# Patient Record
Sex: FEMALE | Race: BLACK OR AFRICAN AMERICAN | NOT HISPANIC OR LATINO
[De-identification: names, ages, dates, MRNs, and addresses within clinical notes are randomized per-mention and may not be internally consistent; named-entity substitution may affect disease eponyms.]

---

## 2020-07-10 ENCOUNTER — RESULT REVIEW (OUTPATIENT)
Age: 60
End: 2020-07-10

## 2022-03-15 DIAGNOSIS — Z00.00 ENCOUNTER FOR GENERAL ADULT MEDICAL EXAMINATION W/OUT ABNORMAL FINDINGS: ICD-10-CM

## 2022-03-16 ENCOUNTER — APPOINTMENT (OUTPATIENT)
Dept: ORTHOPEDIC SURGERY | Facility: CLINIC | Age: 62
End: 2022-03-16
Payer: COMMERCIAL

## 2022-03-16 VITALS
HEIGHT: 59 IN | WEIGHT: 203 LBS | HEART RATE: 80 BPM | BODY MASS INDEX: 40.92 KG/M2 | SYSTOLIC BLOOD PRESSURE: 145 MMHG | DIASTOLIC BLOOD PRESSURE: 86 MMHG

## 2022-03-16 PROCEDURE — 99204 OFFICE O/P NEW MOD 45 MIN: CPT | Mod: 25

## 2022-03-16 PROCEDURE — 20610 DRAIN/INJ JOINT/BURSA W/O US: CPT | Mod: 50

## 2022-03-16 NOTE — DISCUSSION/SUMMARY
[de-identified] : The patient has severe right knee and moderate left knee osteoarthritis with severe stiffness. They are not an appropriate candidate for surgical intervention at this time as she needs to . An extensive discussion was conducted on the natural history of the disease and the variety of surgical and non-surgical options available to the patient including, but not limited to non-steroidal anti-inflammatory medications, steroid injections, physical therapy, maintenance of ideal body weight, and reduction of activity. I recommended a prescription of Mobic but the patient would prefer to use OTC NSAIDs at this time. The patient will schedule an appointment as needed. \par \par Informed consent for bilat knee injections was obtained. All questions were answered. A time out was performed. The  knees were prepped and draped in sterile fashion. Using sterile technique, 40mg of Kenalog, 4cc of 1% lidocaine, 4cc of 0.25% marcaine using a 21-gauge needle. A sterile dressing was applied. Post injection instructions were reviewed. The patient tolerated the procedure well. \par \par We gave her a Rx for PT. She understands the importance of getting her ROM back in the right knee.  I did explain to the patient that their range of motion may not significantly improve after the surgery given the preoperative contracture. The patient expressed understanding of this and has elected to undergo total knee arthroplasty.

## 2022-03-16 NOTE — HISTORY OF PRESENT ILLNESS
[de-identified] : This is a very nice  61 year old  female experiencing worsening pain in both knees R>L. Her ROM is limited chronically. Her right knee pain is severe in intensity and has been going on for at least 3 months now. The pain substantially limits activities of daily living. Walking tolerance is reduced. Medication and activity modification have been minimally effective for a period lasting greater than three months in duration. Assistive devices and external support were not deemed by the patient to be helpful in improving their function.. The patient denies any radiation of the pain to the feet and it is not associated with numbness, tingling, or weakness.

## 2022-03-16 NOTE — PHYSICAL EXAM
[de-identified] : Well developed, well nourished in no apparent distress, awake, alert and orientated to person, place and time. with appropriate mood and affect. \par Respirations are even and unlabored. Gait evaluation does reveal a limp. There is no inguinal adenopathy. \par The legs are well-perfused, without skin lesions, shows a grossly normal motor and sensory examination. \par Knee motion does cause significant pain. ROM of both knees are right 5-60;  left 0-110 degrees.  5 degrees varus.\par The knees are stable within that range-of-motion to AP and ML stress. \par Muscle strength is normal. Pedal pulses are palpable.  [de-identified] : Long standing  knee, AP knee, lateral knee, and patellar views of the bilat knees brought in by patient demonstrate severe right and moderate left knee degenerative joint disease of the knee with joint space narrowing, osteophyte formation, and subchondral sclerosis.

## 2022-05-18 ENCOUNTER — APPOINTMENT (OUTPATIENT)
Dept: ORTHOPEDIC SURGERY | Facility: CLINIC | Age: 62
End: 2022-05-18
Payer: COMMERCIAL

## 2022-05-18 PROCEDURE — 99214 OFFICE O/P EST MOD 30 MIN: CPT

## 2022-05-18 RX ORDER — HYALURONATE SODIUM 20 MG/2 ML
20 SYRINGE (ML) INTRAARTICULAR
Qty: 2 | Refills: 0 | Status: ACTIVE | COMMUNITY
Start: 2022-05-18

## 2022-05-18 RX ORDER — MELOXICAM 15 MG/1
15 TABLET ORAL
Qty: 30 | Refills: 2 | Status: ACTIVE | COMMUNITY
Start: 2022-05-18 | End: 1900-01-01

## 2022-05-18 NOTE — PHYSICAL EXAM
[de-identified] : Well developed, well nourished in no apparent distress, awake, alert and orientated to person, place and time. with appropriate mood and affect. \par Respirations are even and unlabored. Gait evaluation does reveal a limp. There is no inguinal adenopathy. \par The legs are well-perfused, without skin lesions, shows a grossly normal motor and sensory examination. \par Knee motion does cause significant pain. ROM of both knees are right 5-700;  left 0-110 degrees.  5 degrees varus.\par The knees are stable within that range-of-motion to AP and ML stress. \par Muscle strength is normal. Pedal pulses are palpable.

## 2022-05-18 NOTE — DISCUSSION/SUMMARY
[de-identified] : The patient has severe right knee and moderate left knee osteoarthritis with severe stiffness. They are not an appropriate candidate for surgical intervention at this time as she needs to . An extensive discussion was conducted on the natural history of the disease and the variety of surgical and non-surgical options available to the patient including, but not limited to non-steroidal anti-inflammatory medications, steroid injections, physical therapy, maintenance of ideal body weight, and reduction of activity. Mobic rxd. Continue PT. I will apply to her insurance for authorization for euflexxa injection series. Follow up when euflexxa is authorized.

## 2022-05-18 NOTE — HISTORY OF PRESENT ILLNESS
[de-identified] : This is a very nice 61 year old  female experiencing worsening pain in both knees R>L. Her ROM is limited chronically. Her right knee pain is severe in intensity and has been going on for at least 3 months now. The pain substantially limits activities of daily living. Walking tolerance is reduced. Assistive devices and external support were not deemed by the patient to be helpful in improving their function.. The patient denies any radiation of the pain to the feet and it is not associated with numbness, tingling, or weakness.  Cortisone helped x 2 months. Working with PT.

## 2022-08-12 ENCOUNTER — APPOINTMENT (OUTPATIENT)
Dept: ORTHOPEDIC SURGERY | Facility: CLINIC | Age: 62
End: 2022-08-12

## 2022-08-12 VITALS — WEIGHT: 194 LBS | HEIGHT: 59 IN | BODY MASS INDEX: 39.11 KG/M2

## 2022-08-12 PROCEDURE — 99214 OFFICE O/P EST MOD 30 MIN: CPT | Mod: 25

## 2022-08-12 PROCEDURE — 20610 DRAIN/INJ JOINT/BURSA W/O US: CPT | Mod: RT

## 2022-08-13 NOTE — PHYSICAL EXAM
[de-identified] : Well developed, well nourished in no apparent distress, awake, alert and orientated to person, place and time. with appropriate mood and affect. \par Respirations are even and unlabored. Gait evaluation does reveal a limp. There is no inguinal adenopathy. \par The legs are well-perfused, without skin lesions, shows a grossly normal motor and sensory examination. \par Knee motion does cause significant pain. ROM of both knees are right 10-70;  left 0-110 degrees.  5 degrees varus.\par The knees are stable within that range-of-motion to AP and ML stress. \par Muscle strength is normal. Pedal pulses are palpable.

## 2022-08-13 NOTE — DISCUSSION/SUMMARY
[de-identified] : The patient has severe right knee and moderate left knee osteoarthritis with severe stiffness. They are not an appropriate candidate for surgical intervention at this time as she needs to . An extensive discussion was conducted on the natural history of the disease and the variety of surgical and non-surgical options available to the patient including, but not limited to non-steroidal anti-inflammatory medications, steroid injections, physical therapy, maintenance of ideal body weight, and reduction of activity. Continue PT. today we performed a right knee intra-articular cortisone injection.\par \par Informed consent for the right knee injection was obtained. All questions were answered. A time out was performed. The right knee was prepped and draped in sterile fashion. Using sterile technique, the right knee was injected with 80mg of Kenalog, 4cc of 1% lidocaine, 4cc of 0.25% marcaine using a 21-gauge needle. A sterile dressing was applied. Post injection instructions were reviewed. The patient tolerated the procedure well.\par

## 2022-08-13 NOTE — HISTORY OF PRESENT ILLNESS
[de-identified] : This is a very nice 61 year old female experiencing worsening pain in both knees R>L.  She has right knee osteoarthritis.  Her ROM is limited chronically. Her right knee pain is severe in intensity and has been going on for at least 3 months now. The pain substantially limits activities of daily living. Walking tolerance is reduced. Assistive devices and external support were not deemed by the patient to be helpful in improving their function.. The patient denies any radiation of the pain to the feet and it is not associated with numbness, tingling, or weakness.  Cortisone helped x 2 months. Working with PT. working on weight loss.  Euflexxa was previously denied.  Previously took Mobic but this was discontinued by her primary care physician due to increasing creatinine.  Physical therapy has helped.

## 2023-01-26 ENCOUNTER — APPOINTMENT (OUTPATIENT)
Dept: SURGERY | Facility: CLINIC | Age: 63
End: 2023-01-26
Payer: COMMERCIAL

## 2023-01-26 VITALS
SYSTOLIC BLOOD PRESSURE: 180 MMHG | WEIGHT: 210 LBS | BODY MASS INDEX: 42.33 KG/M2 | HEIGHT: 59 IN | HEART RATE: 84 BPM | TEMPERATURE: 96.9 F | DIASTOLIC BLOOD PRESSURE: 101 MMHG

## 2023-01-26 DIAGNOSIS — Z82.49 FAMILY HISTORY OF ISCHEMIC HEART DISEASE AND OTHER DISEASES OF THE CIRCULATORY SYSTEM: ICD-10-CM

## 2023-01-26 DIAGNOSIS — Z78.9 OTHER SPECIFIED HEALTH STATUS: ICD-10-CM

## 2023-01-26 DIAGNOSIS — Z86.39 PERSONAL HISTORY OF OTHER ENDOCRINE, NUTRITIONAL AND METABOLIC DISEASE: ICD-10-CM

## 2023-01-26 DIAGNOSIS — Z83.3 FAMILY HISTORY OF DIABETES MELLITUS: ICD-10-CM

## 2023-01-26 DIAGNOSIS — Z63.4 DISAPPEARANCE AND DEATH OF FAMILY MEMBER: ICD-10-CM

## 2023-01-26 DIAGNOSIS — Z87.891 PERSONAL HISTORY OF NICOTINE DEPENDENCE: ICD-10-CM

## 2023-01-26 DIAGNOSIS — Z86.79 PERSONAL HISTORY OF OTHER DISEASES OF THE CIRCULATORY SYSTEM: ICD-10-CM

## 2023-01-26 DIAGNOSIS — Z12.39 ENCOUNTER FOR OTHER SCREENING FOR MALIGNANT NEOPLASM OF BREAST: ICD-10-CM

## 2023-01-26 PROCEDURE — 99243 OFF/OP CNSLTJ NEW/EST LOW 30: CPT

## 2023-01-26 RX ORDER — SIMVASTATIN 20 MG/1
20 TABLET, FILM COATED ORAL
Refills: 0 | Status: ACTIVE | COMMUNITY

## 2023-01-26 RX ORDER — ATENOLOL 100 MG/1
100 TABLET ORAL
Refills: 0 | Status: ACTIVE | COMMUNITY

## 2023-01-26 RX ORDER — NIFEDIPINE 90 MG
90 TABLET, EXTENDED RELEASE ORAL
Refills: 0 | Status: ACTIVE | COMMUNITY

## 2023-01-26 SDOH — SOCIAL STABILITY - SOCIAL INSECURITY: DISSAPEARANCE AND DEATH OF FAMILY MEMBER: Z63.4

## 2023-01-26 NOTE — CONSULT LETTER
[Dear  ___] : Dear  [unfilled], [Consult Letter:] : I had the pleasure of evaluating your patient, [unfilled]. [Please see my note below.] : Please see my note below. [Consult Closing:] : Thank you very much for allowing me to participate in the care of this patient.  If you have any questions, please do not hesitate to contact me. [Sincerely,] : Sincerely, [FreeTextEntry3] : Murray Reis MD, FACS

## 2023-01-26 NOTE — DATA REVIEWED
[FreeTextEntry1] : 			\par Exam requested by:\par TRISTIAN ESCOBAR MD\par [address]par [facility] [id number]\par SITE PERFORMED: [facility]\par SITE PHONE: [phone]\par Patient: HERBER MATTHEWS\par YOB: 1960\par Phone: [phone]\par MRN: [id number] Acc: [id number]\par Date of Exam: 2022\par  \par EXAM: ULTRASOUND-GUIDED CORE BIOPSY 1 SITE\par \par HISTORY: The patient is 61 years old and is referred for an ultrasound-guided needle biopsy of a left 9:00 position mass.\par \par COMPARISON: Prior breast imaging studies dated 8/3/2021 \par \par PROCEDURE: Targeted ultrasound performed prior to the procedure reconfirms the suspicious findin.4 cm mass at the left 9 o'clock location, 8 cm from the nipple. \par \par The risks and benefits of the procedure were conveyed to the patient, and the patient consented to the procedure. Universal timeout was performed.\par \par Using sterile technique, 5 mL of 1% lidocaine was administered. A skin incision was made prior to insertion of the biopsy needle. Under sonographic visualization, a 14-gauge spring-loaded core biopsy device was used to sample the target. 3 samples were obtained. An S shaped biopsy clip was deployed at the biopsy site. A sonographically visualized residual lesion was present post core biopsy. \par \par A postprocedure mammogram demonstrates appropriate placement of the clip. \par \par The patient tolerated the procedure well without complications. The patient was given postbiopsy care instructions. The specimen was subsequently sent to the pathology lab. \par \par IMPRESSION: 1 site ultrasound-guided core biopsy was performed. 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 \par \par Thank you for the opportunity to participate in the care of this patient.  \par  \par Sirisha Horan MD  - Electronically Signed: 2022 11:04 AM \par Physician to Physician Direct Line is: [phone]

## 2023-01-26 NOTE — PLAN
[FreeTextEntry1] : Ms. MATTHEWS  is presenting  today for an evaluation .  she is doing well and offers no complaints.  Results of  her recent  imaging, breast biopsy  and physical examination findings were discussed in details.   She  was advised to have BL     breast US and Mammogram  and return after the tests. Importance of monthly self-breast examination was reinforced.  Patient's questions and concerns addressed to patient's satisfaction.\par

## 2023-01-26 NOTE — HISTORY OF PRESENT ILLNESS
[de-identified] :  Patient is a 62 year -old female  who was referred by Dr. TRISTIAN ESCOBAR with the chief complaint of having a Left  breast mass. She  denies any trauma and She has no nipple discharge. She  denies any fever, night sweats or loss of appetite.    There is no family history of breast carcinoma.   Menarche at age 9 -3 para-3 and her last menstrual period was in her late 40s. Patient is on no hormonal replacement therapy.    Patient  had a Left breast US  on 2022 that was deemed   BIRADS  4. Ms. MATTHEWS  is s/p US guided left breast biopsy on 2022 . Patient's pathology results were  consistent with fibroadenoma. Concordant. patient had her last Mammogram in 2021

## 2023-01-26 NOTE — PHYSICAL EXAM
[Alert] : alert [Oriented to Person] : oriented to person [Oriented to Place] : oriented to place [Oriented to Time] : oriented to time [Calm] : calm [de-identified] : She  is alert, well-groomed, and in NAD\par   [de-identified] : anicteric.  Nasal mucosa pink, septum midline. Oral mucosa pink.  Tongue midline, Pharynx without exudates.\par   [de-identified] : Neck supple. Trachea midline. Thyroid isthmus barely palpable, lobes not felt.\par   [de-identified] : No chest deformity. Breast are symmetric, Normal contours. No nodules, masses, tenderness, or axillary or supraclavicular  adenopathy. No nipple discharge. no skin retraction \par

## 2023-04-04 ENCOUNTER — APPOINTMENT (OUTPATIENT)
Dept: ORTHOPEDIC SURGERY | Facility: CLINIC | Age: 63
End: 2023-04-04
Payer: COMMERCIAL

## 2023-04-04 VITALS — WEIGHT: 204 LBS | BODY MASS INDEX: 41.12 KG/M2 | HEIGHT: 59 IN

## 2023-04-04 PROCEDURE — 99214 OFFICE O/P EST MOD 30 MIN: CPT | Mod: 25

## 2023-04-04 PROCEDURE — 20610 DRAIN/INJ JOINT/BURSA W/O US: CPT | Mod: RT

## 2023-04-04 NOTE — HISTORY OF PRESENT ILLNESS
[de-identified] : This is a very nice 62 year old female experiencing worsening pain in both knees R>L.  She has right knee osteoarthritis.  Her ROM is limited chronically. Her right knee pain is severe in intensity. . The pain substantially limits activities of daily living. Walking tolerance is reduced. Assistive devices and external support were not deemed by the patient to be helpful in improving their function.. The patient denies any radiation of the pain to the feet and it is not associated with numbness, tingling, or weakness.  Cortisone helped x 2 months. Working with PT. working on weight loss.  Euflexxa was previously denied.  Previously took Mobic but this was discontinued by her primary care physician due to increasing creatinine.  Physical therapy has helped.

## 2023-04-04 NOTE — PHYSICAL EXAM
[de-identified] : Well developed, well nourished in no apparent distress, awake, alert and orientated to person, place and time. with appropriate mood and affect. \par Respirations are even and unlabored. Gait evaluation does reveal a limp. There is no inguinal adenopathy. \par The legs are well-perfused, without skin lesions, shows a grossly normal motor and sensory examination. \par Knee motion does cause significant pain. ROM of both knees are right 10-70;  left 0-110 degrees.  5 degrees varus.\par The knees are stable within that range-of-motion to AP and ML stress. \par Muscle strength is normal. Pedal pulses are palpable.

## 2023-04-04 NOTE — DISCUSSION/SUMMARY
[de-identified] : The patient has severe right knee and moderate left knee osteoarthritis with severe stiffness. They are not an appropriate candidate for surgical intervention at this time as she needs to . An extensive discussion was conducted on the natural history of the disease and the variety of surgical and non-surgical options available to the patient including, but not limited to non-steroidal anti-inflammatory medications, steroid injections, physical therapy, maintenance of ideal body weight, and reduction of activity. Continue PT. today we performed a right knee intra-articular cortisone injection.\par \par Informed consent for the right knee injection was obtained. All questions were answered. A time out was performed. The right knee was prepped and draped in sterile fashion. Using sterile technique, the right knee was injected with 80mg of Kenalog, 4cc of 1% lidocaine, 4cc of 0.25% marcaine using a 21-gauge needle. A sterile dressing was applied. Post injection instructions were reviewed. The patient tolerated the procedure well.\par

## 2023-04-14 PROBLEM — R92.8 ABNORMAL ULTRASOUND OF BREAST: Status: ACTIVE | Noted: 2023-04-14

## 2023-04-17 ENCOUNTER — APPOINTMENT (OUTPATIENT)
Dept: SURGERY | Facility: CLINIC | Age: 63
End: 2023-04-17
Payer: COMMERCIAL

## 2023-04-17 DIAGNOSIS — R92.8 OTHER ABNORMAL AND INCONCLUSIVE FINDINGS ON DIAGNOSTIC IMAGING OF BREAST: ICD-10-CM

## 2023-04-24 ENCOUNTER — APPOINTMENT (OUTPATIENT)
Dept: SURGERY | Facility: CLINIC | Age: 63
End: 2023-04-24
Payer: COMMERCIAL

## 2023-04-24 VITALS
HEART RATE: 92 BPM | DIASTOLIC BLOOD PRESSURE: 84 MMHG | SYSTOLIC BLOOD PRESSURE: 164 MMHG | WEIGHT: 204 LBS | BODY MASS INDEX: 41.12 KG/M2 | HEIGHT: 59 IN

## 2023-04-24 DIAGNOSIS — N63.20 UNSPECIFIED LUMP IN THE LEFT BREAST, UNSPECIFIED QUADRANT: ICD-10-CM

## 2023-04-24 PROCEDURE — 99213 OFFICE O/P EST LOW 20 MIN: CPT

## 2023-04-24 NOTE — HISTORY OF PRESENT ILLNESS
[de-identified] : This is  a 62 year   old patient presenting today for a breast exam and to discuss the results of her recent  breast imaging. Patient had a BL breast US and   BL breast Mammogram on 2023. Deemed BIRADS category 4.  Ms. MATTHEWS denies any trauma and she has no nipple discharge. Patient denies any fever, night sweats or loss of appetite.    Patient is on no hormonal replacement therapy. \par \par PERTINENT HISTORY: \par Patient is a 62 year -old female who was referred by Dr. TRISTIAN ESCOBAR with the chief complaint of having a Left breast mass.  There is no family history of breast carcinoma. Menarche at age 9 -3 para-3 and her last menstrual period was in her late 40s.    Patient had a Left breast US on 2022 that was deemed BIRADS 4. Ms. MATTHEWS is s/p US guided left breast biopsy on 2022. Patient's pathology results were consistent with fibroadenoma. Concordant. patient had her last Mammogram in 2021 [de-identified] : Patient reports no interval changes to her overall health status or medical history

## 2023-04-24 NOTE — PHYSICAL EXAM
[Alert] : alert [Oriented to Person] : oriented to person [Oriented to Place] : oriented to place [Oriented to Time] : oriented to time [Calm] : calm [de-identified] : She  is alert, well-groomed, and in NAD\par   [de-identified] : anicteric.  Nasal mucosa pink, septum midline. Oral mucosa pink.  Tongue midline, Pharynx without exudates.\par   [de-identified] : Neck supple. Trachea midline. Thyroid isthmus barely palpable, lobes not felt.\par   [de-identified] : No chest deformity. Breast are symmetric, Normal contours. No nodules, masses, tenderness, or axillary or supraclavicular  adenopathy. No nipple discharge. no skin retraction \par

## 2023-04-24 NOTE — PLAN
[FreeTextEntry1] : Ms. MATTHEWS  is presenting  today for an evaluation .  she is doing well and offers no complaints.  Results of  her recent  imaging and physical examination findings were discussed in details.   She  was advised to have    Left   breast US guided biopsy   and return after the tests. Importance of monthly self-breast examination was reinforced.  Patient's questions and concerns addressed to patient's satisfaction.\par

## 2023-12-19 ENCOUNTER — APPOINTMENT (OUTPATIENT)
Dept: ORTHOPEDIC SURGERY | Facility: CLINIC | Age: 63
End: 2023-12-19
Payer: COMMERCIAL

## 2023-12-19 VITALS — BODY MASS INDEX: 39.11 KG/M2 | HEIGHT: 59 IN | WEIGHT: 194 LBS

## 2023-12-19 PROCEDURE — 73564 X-RAY EXAM KNEE 4 OR MORE: CPT | Mod: 50

## 2023-12-19 PROCEDURE — 99214 OFFICE O/P EST MOD 30 MIN: CPT | Mod: 25

## 2023-12-19 PROCEDURE — 20610 DRAIN/INJ JOINT/BURSA W/O US: CPT | Mod: RT

## 2023-12-19 NOTE — HISTORY OF PRESENT ILLNESS
[de-identified] : This is a very nice 62 year old female experiencing worsening pain in both knees R>L.  She has right knee osteoarthritis.  Her ROM is limited chronically. Her right knee pain is severe in intensity. . The pain substantially limits activities of daily living. Walking tolerance is reduced. Assistive devices and external support were not deemed by the patient to be helpful in improving their function.. The patient denies any radiation of the pain to the feet and it is not associated with numbness, tingling, or weakness.  Cortisone helped x 2 months. Working with PT. working on weight loss.  Euflexxa was previously denied.  Previously took Mobic but this was discontinued by her primary care physician due to increasing creatinine.  Physical therapy has helped.

## 2023-12-19 NOTE — DISCUSSION/SUMMARY
[de-identified] : The patient has severe right knee and moderate left knee osteoarthritis with severe stiffness. They are not an appropriate candidate for surgical intervention at this time as she needs to . An extensive discussion was conducted on the natural history of the disease and the variety of surgical and non-surgical options available to the patient including, but not limited to non-steroidal anti-inflammatory medications, steroid injections, physical therapy, maintenance of ideal body weight, and reduction of activity. Continue PT. today we performed a right knee intra-articular cortisone injection.\par  \par  Informed consent for the right knee injection was obtained. All questions were answered. A time out was performed. The right knee was prepped and draped in sterile fashion. Using sterile technique, the right knee was injected with 80mg of Kenalog, 4cc of 1% lidocaine, 4cc of 0.25% marcaine using a 21-gauge needle. A sterile dressing was applied. Post injection instructions were reviewed. The patient tolerated the procedure well.\par

## 2023-12-19 NOTE — PHYSICAL EXAM
[de-identified] : Well developed, well nourished in no apparent distress, awake, alert and orientated to person, place and time. with appropriate mood and affect. \par  Respirations are even and unlabored. Gait evaluation does reveal a limp. There is no inguinal adenopathy. \par  The legs are well-perfused, without skin lesions, shows a grossly normal motor and sensory examination. \par  Knee motion does cause significant pain. ROM of both knees are right 10-70;  left 0-110 degrees.  5 degrees varus.\par  The knees are stable within that range-of-motion to AP and ML stress. \par  Muscle strength is normal. Pedal pulses are palpable.  [de-identified] : Long standing knee, AP knee, lateral knee, and patellar views of the bilat knees brought in by patient demonstrate severe right and moderate left knee degenerative joint disease of the knee with joint space narrowing, osteophyte formation, and subchondral sclerosis.

## 2024-04-09 ENCOUNTER — APPOINTMENT (OUTPATIENT)
Dept: ORTHOPEDIC SURGERY | Facility: CLINIC | Age: 64
End: 2024-04-09
Payer: COMMERCIAL

## 2024-04-09 VITALS — HEIGHT: 59 IN | WEIGHT: 198 LBS | BODY MASS INDEX: 39.92 KG/M2

## 2024-04-09 PROCEDURE — 99214 OFFICE O/P EST MOD 30 MIN: CPT

## 2024-04-09 NOTE — DISCUSSION/SUMMARY
[de-identified] : The patient has severe right knee and moderate left knee osteoarthritis with severe stiffness. She has failed conservative management including injections, PT, and mobic. An extensive discussion was conducted on the natural history of the disease and the variety of surgical and non-surgical options available to the patient including, but not limited to non-steroidal anti-inflammatory medications, steroid injections, physical therapy, maintenance of ideal body weight, and reduction of activity. Continue PT. Discussed that preoperative limited knee ROM can predict poor postoperative out Referred for evaluation to my partner Dr. Robison.

## 2024-04-09 NOTE — HISTORY OF PRESENT ILLNESS
[de-identified] : This is a very nice 63 year old female experiencing worsening pain in both knees R>L.  She has right knee osteoarthritis.  Her ROM is limited chronically and she stopped PT already. Her right knee pain is severe in intensity. . The pain substantially limits activities of daily living. Walking tolerance is reduced. Assistive devices and external support were not deemed by the patient to be helpful in improving their function.. The patient denies any radiation of the pain to the feet and it is not associated with numbness, tingling, or weakness.  Cortisone helped x 2 months. Working with PT. working on weight loss.  Euflexxa was previously denied.  Previously took Mobic but this was discontinued by her primary care physician due to increasing creatinine.  Physical therapy has helped.

## 2024-04-09 NOTE — PHYSICAL EXAM
[de-identified] : Well developed, well nourished in no apparent distress, awake, alert and orientated to person, place and time. with appropriate mood and affect.  Respirations are even and unlabored. Gait evaluation does reveal a limp. There is no inguinal adenopathy.  The legs are well-perfused, without skin lesions, shows a grossly normal motor and sensory examination.  Knee motion does cause significant pain. ROM of both knees are right 10-70;  left 0-105 degrees.  5 degrees varus. The knees are stable within that range-of-motion to AP and ML stress.  Muscle strength is normal. Pedal pulses are palpable.  [de-identified] : Long standing knee, AP knee, lateral knee, and patellar views of the bilat knees brought in by patient demonstrate severe right and moderate left knee degenerative joint disease of the knee with joint space narrowing, osteophyte formation, and subchondral sclerosis.

## 2024-04-12 ENCOUNTER — APPOINTMENT (OUTPATIENT)
Dept: ORTHOPEDIC SURGERY | Facility: CLINIC | Age: 64
End: 2024-04-12
Payer: COMMERCIAL

## 2024-04-12 VITALS
BODY MASS INDEX: 39.92 KG/M2 | HEIGHT: 59 IN | SYSTOLIC BLOOD PRESSURE: 146 MMHG | WEIGHT: 198 LBS | DIASTOLIC BLOOD PRESSURE: 84 MMHG

## 2024-04-12 PROCEDURE — 72170 X-RAY EXAM OF PELVIS: CPT

## 2024-04-12 PROCEDURE — 73564 X-RAY EXAM KNEE 4 OR MORE: CPT | Mod: 50

## 2024-04-12 PROCEDURE — 99214 OFFICE O/P EST MOD 30 MIN: CPT

## 2024-04-14 NOTE — HISTORY OF PRESENT ILLNESS
[de-identified] : Oliva Kunz is a 63 year old female who presented to the office for evaluation of her bilateral knee pain. Patient has been experiencing bilateral (right greater than left) knee pain for years. Pain is located over the anterior and posterolateral knee. It can radiate to the tibias. Patient can have back pain that radiates down the legs. She has tried anti-inflammatories, but it increased her creatinine. She now takes Tylenol, without significant relief. Patient has tried physical therapy, which initially helped. She has tried corticosteroid injections, which helped for about 4 weeks. He last injection was in December. Viscosupplementation injections were previously denied by insurance. No hip pain.  History: HTN, Renal Cysts

## 2024-04-14 NOTE — PHYSICAL EXAM
[de-identified] : Constitutional:  63 year old female, alert and oriented, cooperative, in no acute distress.  HEENT  NC/AT.  Appearance: symmetric  Chest/Respiratory  Respiratory effort: no intercostal retractions or use of accessory muscles. Nonlabored Breathing  Mental Status:  Judgment, insight: intact Orientation: oriented to time, place, and person  Left Knee  Inspection:     Skin intact, no rashes or lesions     No Effusion     Non-tender to palpation over tibial tubercle, patella, medial and lateral joint line, and pes insertion.  Range of Motion: 	Extension - 0 degrees 	Flexion - 105 degrees 	Alignment - Varus 3 degrees 	Extensor lag: None  Stability:      Demonstrates no Varus or Valgus instability      Negative Anterior or Posterior drawer.      Negative Lachman's  Patella: stable, tracks well.   Right Knee  Inspection:     Skin intact, no rashes or lesions     No Effusion     Non-tender to palpation over tibial tubercle, patella, medial and lateral joint line, and pes insertion.  Range of Motion: 	Extension - 0 degrees 	Flexion - 80 degrees 	Alignment - Varus 3 degrees 	Extensor lag: None  Stability:      Demonstrates no Varus or Valgus instability      Negative Anterior or Posterior drawer.      Negative Lachman's  Patella: stable, tracks well.   Neurologic Exam     Motor intact including 5/5 Extensor Hallucis Longus, 5/5 Flexor Hallucis Longus, 5/5 Tibialis Anterior and 5/5 Gastrocnemius     Sensation Intact to Light Touch including Saphenous, Sural, Superficial Peroneal, Deep Peroneal, Tibial nerve distributions  Vascular Exam     Foot is warm and well perfused with 2+ Dorsalis Pedis Pulse   No pain with range of motion of the bilateral hips. No lumbar paraspinal muscle tenderness.  [de-identified] : XRay: XRays of the Right Knee (4 Views) taken in the office today and reviewed with the patient. XRays demonstrate tricompartmental joint space narrowing, with bone on bone articulations in the medial compartment, with subchondral sclerosis, overlying osteophytes, all consistent with severe osteoarthritis, KL rdGrdrrdarddrderd:rd rd3rd. There is varus alignment. (my personal interpretation)   XRay: XRays of the Left Knee (4 Views) taken in the office today and reviewed with the patient. XRays demonstrate tricompartmental joint space narrowing, with bone on bone articulations in the medial compartment, with subchondral sclerosis, overlying osteophytes, all consistent with severe osteoarthritis, KL rdGrdrrdarddrderd:rd rd3rd. There is varus alignment. (my personal interpretation)   XRay:  XRays of the Pelvis (1 View) taken in the office today and discussed with the patient. XRays demonstrate no obvious fracture or dislocation. There is no significant evidence of osteoarthritis or osteophyte formation. (my personal interpretation).

## 2024-04-14 NOTE — DISCUSSION/SUMMARY
[de-identified] : Oliva Kunz is a 63 year old female who presented to the office for evaluation of her bilateral knee pain. Patient has been experiencing bilateral (right greater than left) knee pain for years. XRays showed severe bilateral knee osteoarthritis. Examination showed decreased knee range of motion. Discussed with patient the examination and imaging findings.  Discussed with patient the operative and nonoperative management of knee osteoarthritis, including total knee arthroplasty.  Discussed the nonoperative management of knee osteoarthritis, including physical therapy, anti-inflammatories, and injections.  Patient has tried nonoperative management, but continues to have knee pain.  Discussed total knee arthroplasty at length, including surgical procedure, hospital course, DVT prophylaxis, antibiotics, physical therapy, recovery, and risks. Discussed patient's current contractures and risks of postoperative contractures. Patient would like to proceed with total knee arthroplasty.  Discussed that patient will require medical clearance prior to surgery.  Discussed obtaining a CT scan prior to surgery.  Patient understanding and in agreement with the plan.  All questions answered.   Discussed the imaging and physical exam findings with the patient consistent with endstage knee degenerative disease. The patient has failed conservative management including physical therapy, pain control, and injections. The risks, benefits and alternatives to total knee replacement were discussed with the patient in detail and the patient elected to proceed with surgery. Discussed the surgical plan with the patient including implant options and surgical approach.   Surgical risks including fracture requiring fixation, instability or dislocation, temporary or permanent leg length inequality, infection, bleeding, stiffness, failure to alleviate pain, failure to achieve desired results, need for further surgery, scar tissue formation, hardware failure, chronic pain, injury to nerves resulting in extremity dysfunction, injury to arteries and veins, deep vein thrombosis or pulmonary embolism requiring anticoagulation and medical risk factors including heart attack, stroke, death, neurological injury, pneumonia, kidney or other organ failure were discussed with the patient.   Patient was understanding and in agreement with the treatment plan. All questions answered.  Plan: -CT Right Lower Extremity -Medical Clearance -Follow up in 2 weeks for reevaluation and management -Right Total Knee Arthroplasty  Surgical Plan: Diagnosis: Right Knee Osteoarthritis Laterality: Right Operative procedure: Right Total Knee Arthroplasty Location: [facility]  DVT prophylaxis: Aspirin 81mg twice per day TXA: IV Plan for discharge: Home  Pre- & Post Operative Antibiotics: Cefazolin 2g  Clearances:      Medical: Pending  Comorbidities:      Metal Allergy: Negative      Chronic Pain: Negative      Diabetes: Negative      Use of Anticoagulation: Negative      Atrial Fibrillation: Negative      History of VTE: Negative      History of Cardiac Stents: Negative      Skin Infections/Open Wounds: Negative      MRSA Infection/Colonization: Negative      Current Urinary Symptoms: Negative      Immunocompromise: Negative      Inflammatory Arthritis: Negative      Smoking: Negative      Drug Use: Negative      Alcohol Use: Occasional      Obstructive Sleep Apnea: Negative      Neurologic Disease: Negative

## 2024-04-26 ENCOUNTER — APPOINTMENT (OUTPATIENT)
Dept: ORTHOPEDIC SURGERY | Facility: CLINIC | Age: 64
End: 2024-04-26
Payer: COMMERCIAL

## 2024-04-26 ENCOUNTER — OUTPATIENT (OUTPATIENT)
Dept: OUTPATIENT SERVICES | Facility: HOSPITAL | Age: 64
LOS: 1 days | End: 2024-04-26

## 2024-04-26 VITALS
DIASTOLIC BLOOD PRESSURE: 74 MMHG | OXYGEN SATURATION: 100 % | TEMPERATURE: 97 F | HEIGHT: 58 IN | WEIGHT: 195.99 LBS | SYSTOLIC BLOOD PRESSURE: 126 MMHG | RESPIRATION RATE: 16 BRPM | HEART RATE: 88 BPM

## 2024-04-26 VITALS — WEIGHT: 198 LBS | BODY MASS INDEX: 39.92 KG/M2 | HEIGHT: 59 IN

## 2024-04-26 DIAGNOSIS — M17.0 BILATERAL PRIMARY OSTEOARTHRITIS OF KNEE: ICD-10-CM

## 2024-04-26 DIAGNOSIS — M16.11 UNILATERAL PRIMARY OSTEOARTHRITIS, RIGHT HIP: ICD-10-CM

## 2024-04-26 DIAGNOSIS — G47.33 OBSTRUCTIVE SLEEP APNEA (ADULT) (PEDIATRIC): ICD-10-CM

## 2024-04-26 DIAGNOSIS — M17.11 UNILATERAL PRIMARY OSTEOARTHRITIS, RIGHT KNEE: ICD-10-CM

## 2024-04-26 DIAGNOSIS — H40.9 UNSPECIFIED GLAUCOMA: ICD-10-CM

## 2024-04-26 DIAGNOSIS — Z98.49 CATARACT EXTRACTION STATUS, UNSPECIFIED EYE: Chronic | ICD-10-CM

## 2024-04-26 DIAGNOSIS — I10 ESSENTIAL (PRIMARY) HYPERTENSION: ICD-10-CM

## 2024-04-26 DIAGNOSIS — Z98.891 HISTORY OF UTERINE SCAR FROM PREVIOUS SURGERY: Chronic | ICD-10-CM

## 2024-04-26 LAB
A1C WITH ESTIMATED AVERAGE GLUCOSE RESULT: 5.4 % — SIGNIFICANT CHANGE UP (ref 4–5.6)
APPEARANCE UR: CLEAR — SIGNIFICANT CHANGE UP
BACTERIA # UR AUTO: ABNORMAL /HPF
BILIRUB UR-MCNC: NEGATIVE — SIGNIFICANT CHANGE UP
BLD GP AB SCN SERPL QL: NEGATIVE — SIGNIFICANT CHANGE UP
COLOR SPEC: YELLOW — SIGNIFICANT CHANGE UP
DIFF PNL FLD: NEGATIVE — SIGNIFICANT CHANGE UP
ESTIMATED AVERAGE GLUCOSE: 108 — SIGNIFICANT CHANGE UP
GLUCOSE UR QL: 100 MG/DL
KETONES UR-MCNC: NEGATIVE MG/DL — SIGNIFICANT CHANGE UP
LEUKOCYTE ESTERASE UR-ACNC: ABNORMAL
MRSA PCR RESULT.: SIGNIFICANT CHANGE UP
NITRITE UR-MCNC: NEGATIVE — SIGNIFICANT CHANGE UP
PH UR: 6 — SIGNIFICANT CHANGE UP (ref 5–8)
PROT UR-MCNC: 30 MG/DL
RBC CASTS # UR COMP ASSIST: 1 /HPF — SIGNIFICANT CHANGE UP (ref 0–4)
RH IG SCN BLD-IMP: POSITIVE — SIGNIFICANT CHANGE UP
RH IG SCN BLD-IMP: POSITIVE — SIGNIFICANT CHANGE UP
S AUREUS DNA NOSE QL NAA+PROBE: SIGNIFICANT CHANGE UP
SP GR SPEC: 1.02 — SIGNIFICANT CHANGE UP (ref 1–1.03)
SQUAMOUS # UR AUTO: 7 /HPF — HIGH (ref 0–5)
UROBILINOGEN FLD QL: 0.2 MG/DL — SIGNIFICANT CHANGE UP (ref 0.2–1)
WBC UR QL: 5 /HPF — SIGNIFICANT CHANGE UP (ref 0–5)

## 2024-04-26 PROCEDURE — 99024 POSTOP FOLLOW-UP VISIT: CPT

## 2024-04-26 RX ORDER — CHLORHEXIDINE GLUCONATE 213 G/1000ML
1 SOLUTION TOPICAL DAILY
Refills: 0 | Status: DISCONTINUED | OUTPATIENT
Start: 2024-05-01 | End: 2024-05-03

## 2024-04-26 RX ORDER — SODIUM CHLORIDE 9 MG/ML
1000 INJECTION, SOLUTION INTRAVENOUS
Refills: 0 | Status: DISCONTINUED | OUTPATIENT
Start: 2024-05-01 | End: 2024-05-03

## 2024-04-26 NOTE — H&P PST ADULT - ENDOCRINE
details…
23 y/o female presents to PST for scheduled laparoscopic gastric sleeve on 4/29/24. Patient with hx of obesity who failed diet, exercise and usual modalities for weight loss opting for surgical intervention.

## 2024-04-26 NOTE — H&P PST ADULT - HISTORY OF PRESENT ILLNESS
63 yr old female with preop dx of unilateral primary osteoarthritis right knee presents to have PST ermias and is scheduled for right total knee arthroplasty with renu.    Patient presented to the office for evaluation of her bilateral knee pain. Patient has been experiencing bilateral (right greater than left) knee pain for years. Pain is located over the anterior and posterolateral knee. It can radiate to the tibias. Patient can have back pain that radiates down the legs. She has tried anti-inflammatories, but it increased her creatinine. She now takes Tylenol, without significant relief. Patient has tried physical therapy, which initially helped. She has tried corticosteroid injections, which helped for about 4 weeks. Her last injection was in December. Patient states she had bilateral knee pain for a while and has gotten worse over the past two years right>left.

## 2024-04-26 NOTE — H&P PST ADULT - ATTENDING COMMENTS
Oliva Kunz is a 63 year old female, past medical history of hypertension, renal cysts, who presented to the office for evaluation of her bilateral knee pain. Patient has been experiencing bilateral (right greater than left) knee pain for years. Pain is located over the anterior and posterolateral knee. It can radiate to the tibias. Patient can have back pain that radiates down the legs. She has tried anti-inflammatories, but it increased her creatinine. She now takes Tylenol, without significant relief. Patient has tried physical therapy, which initially helped. She has tried corticosteroid injections, which helped for about 4 weeks. He last injection was in December. Pain is now affecting her quality of life. XRays showed severe right knee osteoarthritis. Patient was indicated for a Right Total Knee Arthroplasty. She was cleared by Medicine.     Discussed the operative and nonoperative management of knee osteoarthritis at length with the patient. Nonoperative management would consist of pain control, physical therapy, and anti-inflammatories. The patient had failed conservative management, including physical therapy, anti-inflammatories, and injections. The patient did not want to continue nonoperative management due to the impact knee pain has had on the patient’s quality of life. Operative management would consist of total knee arthroplasty. The risks, benefits and alternatives to total knee arthroplasty were discussed with the patient in detail and the patient elected to proceed with surgery. Discussed the surgical plan and implants with the patient, including robotic assisted total knee arthroplasty. Discussed the recovery process following total knee arthroplasty at length, including, but not limited to, inpatient hospital stay, physical therapy, recovery, antibiotics, and DVT prophylaxis. Surgical risks including fracture requiring fixation, instability or dislocation, temporary or permanent leg length inequality, risks of cementation, infection, bleeding, stiffness, failure to alleviate pain, failure to achieve desired results, need for further surgery, scar tissue formation, hardware failure, component loosening, chronic pain, injury to nerves resulting in extremity dysfunction, injury to arteries and veins, deep vein thrombosis or pulmonary embolism requiring anticoagulation and medical risk factors including heart attack, stroke, death, neurological injury, pneumonia, kidney or other organ failure were discussed with the patient at length.     Following discussion, patient elected to proceed with Right Total Knee Arthroplasty with RENEA Robotic Assistance. Informed consent was obtained. Patient's leg was then marked with verbal confirmation of the patient. Patient was understanding and in agreement with the operative plan. All questions were answered.?     Assessment/Plan:  Oliva Kunz is a 63 year old female who presented for a Right Total Knee Arthroplasty with RENEA Robotic Assistance    Plan:  -Right Total Knee Arthroplasty with RENEA Robotic Assistance  -Clearance in Chart

## 2024-04-27 LAB
CULTURE RESULTS: SIGNIFICANT CHANGE UP
SPECIMEN SOURCE: SIGNIFICANT CHANGE UP

## 2024-04-28 PROBLEM — M17.0 ARTHRITIS OF BOTH KNEES: Status: ACTIVE | Noted: 2022-03-16

## 2024-04-28 PROBLEM — M17.11 ARTHRITIS OF RIGHT KNEE: Status: ACTIVE | Noted: 2022-03-16

## 2024-04-28 NOTE — PHYSICAL EXAM
[de-identified] : Constitutional:  63 year old female, alert and oriented, cooperative, in no acute distress.  HEENT  NC/AT.  Appearance: symmetric  Chest/Respiratory  Respiratory effort: no intercostal retractions or use of accessory muscles. Nonlabored Breathing  Mental Status:  Judgment, insight: intact Orientation: oriented to time, place, and person  Left Knee  Inspection:     Skin intact, no rashes or lesions     No Effusion     Non-tender to palpation over tibial tubercle, patella, medial and lateral joint line, and pes insertion.  Range of Motion: 	Extension - 0 degrees 	Flexion - 105 degrees 	Alignment - Varus 3 degrees 	Extensor lag: None  Stability:      Demonstrates no Varus or Valgus instability      Negative Anterior or Posterior drawer.      Negative Lachman's  Patella: stable, tracks well.   Right Knee  Inspection:     Skin intact, no rashes or lesions     No Effusion     Non-tender to palpation over tibial tubercle, patella, medial and lateral joint line, and pes insertion.  Range of Motion: 	Extension - 0 degrees 	Flexion - 80 degrees 	Alignment - Varus 3 degrees 	Extensor lag: None  Stability:      Demonstrates no Varus or Valgus instability      Negative Anterior or Posterior drawer.      Negative Lachman's  Patella: stable, tracks well.   Neurologic Exam     Motor intact including 5/5 Extensor Hallucis Longus, 5/5 Flexor Hallucis Longus, 5/5 Tibialis Anterior and 5/5 Gastrocnemius     Sensation Intact to Light Touch including Saphenous, Sural, Superficial Peroneal, Deep Peroneal, Tibial nerve distributions  Vascular Exam     Foot is warm and well perfused with 2+ Dorsalis Pedis Pulse   No pain with range of motion of the bilateral hips. No lumbar paraspinal muscle tenderness.  [de-identified] : XRay: XRays of the Right Knee (4 Views) taken on 4/12/2024. XRays demonstrate tricompartmental joint space narrowing, with bone on bone articulations in the medial compartment, with subchondral sclerosis, overlying osteophytes, all consistent with severe osteoarthritis, KL thGthrthathdtheth:th th5th. There is varus alignment. (my personal interpretation)   XRay: XRays of the Left Knee (4 Views) taken on 4/12/2024. XRays demonstrate tricompartmental joint space narrowing, with bone on bone articulations in the medial compartment, with subchondral sclerosis, overlying osteophytes, all consistent with severe osteoarthritis, KL thGthrthathdtheth:th th5th. There is varus alignment. (my personal interpretation)   XRay:  XRays of the Pelvis (1 View) taken on 4/12/2024. XRays demonstrate no obvious fracture or dislocation. There is no significant evidence of osteoarthritis or osteophyte formation. (my personal interpretation).

## 2024-04-28 NOTE — DISCUSSION/SUMMARY
[de-identified] : Oliva Kunz is a 63 year old female who presented to the office for follow up of her bilateral knee pain. Patient has been experiencing bilateral (right greater than left) knee pain for years. Prior XRays showed severe bilateral knee osteoarthritis. Examination showed decreased knee range of motion. Discussed with patient the examination and imaging findings.  Discussed with patient the operative and nonoperative management of knee osteoarthritis, including total knee arthroplasty.  Discussed the nonoperative management of knee osteoarthritis, including physical therapy, anti-inflammatories, and injections.  Patient has tried nonoperative management, but continues to have knee pain.  Discussed total knee arthroplasty at length, including surgical procedure, hospital course, DVT prophylaxis, antibiotics, physical therapy, recovery, and risks. Discussed patient's current contractures and risks of postoperative contractures. Patient would like to proceed with total knee arthroplasty.  Discussed that patient will require medical clearance prior to surgery.  Discussed obtaining a CT scan prior to surgery.  Patient understanding and in agreement with the plan.  All questions answered.   Discussed the imaging and physical exam findings with the patient consistent with endstage knee degenerative disease. The patient has failed conservative management including physical therapy, pain control, and injections. The risks, benefits and alternatives to total knee replacement were discussed with the patient in detail and the patient elected to proceed with surgery. Discussed the surgical plan with the patient including implant options and surgical approach.   Surgical risks including fracture requiring fixation, instability or dislocation, temporary or permanent leg length inequality, infection, bleeding, stiffness, failure to alleviate pain, failure to achieve desired results, need for further surgery, scar tissue formation, hardware failure, chronic pain, injury to nerves resulting in extremity dysfunction, injury to arteries and veins, deep vein thrombosis or pulmonary embolism requiring anticoagulation and medical risk factors including heart attack, stroke, death, neurological injury, pneumonia, kidney or other organ failure were discussed with the patient.   Patient was understanding and in agreement with the treatment plan. All questions answered.  Plan: -CT Right Lower Extremity: Done -Medical Clearance -Right Total Knee Arthroplasty  Surgical Plan: Diagnosis: Right Knee Osteoarthritis Laterality: Right Operative procedure: Right Total Knee Arthroplasty Location: [facility]  DVT prophylaxis: Aspirin 81mg twice per day TXA: IV Plan for discharge: Home  Pre- & Post Operative Antibiotics: Cefazolin 2g  Clearances:      Medical: Pending  Comorbidities:      Metal Allergy: Negative      Chronic Pain: Negative      Diabetes: Negative      Use of Anticoagulation: Negative      Atrial Fibrillation: Negative      History of VTE: Negative      History of Cardiac Stents: Negative      Skin Infections/Open Wounds: Negative      MRSA Infection/Colonization: Negative      Current Urinary Symptoms: Negative      Immunocompromise: Negative      Inflammatory Arthritis: Negative      Smoking: Negative      Drug Use: Negative      Alcohol Use: Occasional      Obstructive Sleep Apnea: Negative      Neurologic Disease: Negative

## 2024-04-28 NOTE — HISTORY OF PRESENT ILLNESS
[de-identified] : 4/26/2024  Oliva Kunz presents to the office for follow-up of her bilateral knee pain.  Patient continues to have bilateral (right greater than left) knee pain.  She went to presurgical testing today.  No falls.  No fevers or chills.  4/12/2024 Oliva Kunz is a 63 year old female who presented to the office for evaluation of her bilateral knee pain. Patient has been experiencing bilateral (right greater than left) knee pain for years. Pain is located over the anterior and posterolateral knee. It can radiate to the tibias. Patient can have back pain that radiates down the legs. She has tried anti-inflammatories, but it increased her creatinine. She now takes Tylenol, without significant relief. Patient has tried physical therapy, which initially helped. She has tried corticosteroid injections, which helped for about 4 weeks. He last injection was in December. Viscosupplementation injections were previously denied by insurance. No hip pain.  History: HTN, Renal Cysts

## 2024-04-30 ENCOUNTER — TRANSCRIPTION ENCOUNTER (OUTPATIENT)
Age: 64
End: 2024-04-30

## 2024-04-30 NOTE — ASU PATIENT PROFILE, ADULT - FALL HARM RISK - UNIVERSAL INTERVENTIONS
Bed in lowest position, wheels locked, appropriate side rails in place/Call bell, personal items and telephone in reach/Instruct patient to call for assistance before getting out of bed or chair/Non-slip footwear when patient is out of bed/[place] to call system/Physically safe environment - no spills, clutter or unnecessary equipment/Purposeful Proactive Rounding/Room/bathroom lighting operational, light cord in reach

## 2024-04-30 NOTE — ASU PATIENT PROFILE, ADULT - AS SC BRADEN MOBILITY
Problem: PHYSICAL THERAPY ADULT  Goal: Performs mobility at highest level of function for planned discharge setting  See evaluation for individualized goals  Description: Treatment/Interventions: Functional transfer training, LE strengthening/ROM, Elevations, Therapeutic exercise, Endurance training, Patient/family training, Equipment eval/education, Bed mobility, Gait training, Spoke to nursing, OT  Equipment Recommended: Alvaro Evans       See flowsheet documentation for full assessment, interventions and recommendations  Outcome: Progressing  Note: Prognosis: Fair  Problem List: Decreased strength, Decreased range of motion, Decreased endurance, Decreased mobility, Pain  Assessment: Pt  progressing well with mobility  Pt  noted with decreased endurance however improved tolerance to activities as session progressed which was evident from decreased seated rest between ambulation  pt  reported the steps she needs to negotiate at home do not have any handrail but she holds onto her son for the same, Pt  needed no hands on assist for transfers  CGA/CS provided for Ambulation  recommend using RW at this time for mobility  Talked to attending PT Keyur Doctor and agreed patient is a possible home with home PT if she continue to progress with mobility vs rehab  Will conitnue to progress patient as able  Barriers to Discharge: Inaccessible home environment, Decreased caregiver support  Barriers to Discharge Comments: increased ambulation, son is not expected to be home all day upon discharge     PT Discharge Recommendation: Post acute rehabilitation services     PT - OK to Discharge: Yes    See flowsheet documentation for full assessment  (3) slightly limited

## 2024-05-01 ENCOUNTER — APPOINTMENT (OUTPATIENT)
Dept: ORTHOPEDIC SURGERY | Facility: HOSPITAL | Age: 64
End: 2024-05-01

## 2024-05-01 ENCOUNTER — INPATIENT (INPATIENT)
Facility: HOSPITAL | Age: 64
LOS: 1 days | Discharge: HOME HEALTH | End: 2024-05-03
Attending: STUDENT IN AN ORGANIZED HEALTH CARE EDUCATION/TRAINING PROGRAM | Admitting: STUDENT IN AN ORGANIZED HEALTH CARE EDUCATION/TRAINING PROGRAM
Payer: COMMERCIAL

## 2024-05-01 VITALS
SYSTOLIC BLOOD PRESSURE: 162 MMHG | HEART RATE: 79 BPM | RESPIRATION RATE: 16 BRPM | HEIGHT: 58 IN | OXYGEN SATURATION: 100 % | WEIGHT: 195.99 LBS | TEMPERATURE: 98 F | DIASTOLIC BLOOD PRESSURE: 91 MMHG

## 2024-05-01 DIAGNOSIS — Z98.49 CATARACT EXTRACTION STATUS, UNSPECIFIED EYE: Chronic | ICD-10-CM

## 2024-05-01 DIAGNOSIS — Z98.891 HISTORY OF UTERINE SCAR FROM PREVIOUS SURGERY: Chronic | ICD-10-CM

## 2024-05-01 DIAGNOSIS — M16.11 UNILATERAL PRIMARY OSTEOARTHRITIS, RIGHT HIP: ICD-10-CM

## 2024-05-01 PROBLEM — I10 ESSENTIAL (PRIMARY) HYPERTENSION: Chronic | Status: ACTIVE | Noted: 2024-04-26

## 2024-05-01 PROBLEM — H40.9 UNSPECIFIED GLAUCOMA: Chronic | Status: ACTIVE | Noted: 2024-04-26

## 2024-05-01 PROBLEM — E78.5 HYPERLIPIDEMIA, UNSPECIFIED: Chronic | Status: ACTIVE | Noted: 2024-04-26

## 2024-05-01 PROBLEM — H26.9 UNSPECIFIED CATARACT: Chronic | Status: ACTIVE | Noted: 2024-04-26

## 2024-05-01 LAB — GLUCOSE BLDC GLUCOMTR-MCNC: 86 MG/DL — SIGNIFICANT CHANGE UP (ref 70–99)

## 2024-05-01 PROCEDURE — 73560 X-RAY EXAM OF KNEE 1 OR 2: CPT | Mod: 26,RT

## 2024-05-01 PROCEDURE — 0055T BONE SRGRY CMPTR CT/MRI IMAG: CPT

## 2024-05-01 PROCEDURE — 27447 TOTAL KNEE ARTHROPLASTY: CPT | Mod: RT

## 2024-05-01 DEVICE — CEMENT SIMPLEX WITH TOBRAMYCIN: Type: IMPLANTABLE DEVICE | Site: RIGHT | Status: FUNCTIONAL

## 2024-05-01 DEVICE — MAKO BONE PIN 3.2MM X 140MM: Type: IMPLANTABLE DEVICE | Site: RIGHT | Status: FUNCTIONAL

## 2024-05-01 DEVICE — IMP PATELLA ASYMMETRIC X3 29X9MM: Type: IMPLANTABLE DEVICE | Site: RIGHT | Status: FUNCTIONAL

## 2024-05-01 DEVICE — MAKO BONE PIN 3.2MM X 110MM: Type: IMPLANTABLE DEVICE | Site: RIGHT | Status: FUNCTIONAL

## 2024-05-01 DEVICE — IMPLANTABLE DEVICE: Type: IMPLANTABLE DEVICE | Site: RIGHT | Status: FUNCTIONAL

## 2024-05-01 DEVICE — COMP FEM TRIATHLON CR SZ 3 RT: Type: IMPLANTABLE DEVICE | Site: RIGHT | Status: FUNCTIONAL

## 2024-05-01 DEVICE — BASEPLATE TIB UNIV TRIATHLON SZ 2: Type: IMPLANTABLE DEVICE | Site: RIGHT | Status: FUNCTIONAL

## 2024-05-01 RX ORDER — PANTOPRAZOLE SODIUM 20 MG/1
40 TABLET, DELAYED RELEASE ORAL
Refills: 0 | Status: DISCONTINUED | OUTPATIENT
Start: 2024-05-01 | End: 2024-05-03

## 2024-05-01 RX ORDER — ATENOLOL 25 MG/1
100 TABLET ORAL DAILY
Refills: 0 | Status: DISCONTINUED | OUTPATIENT
Start: 2024-05-01 | End: 2024-05-03

## 2024-05-01 RX ORDER — CEFAZOLIN SODIUM 1 G
2000 VIAL (EA) INJECTION EVERY 8 HOURS
Refills: 0 | Status: DISCONTINUED | OUTPATIENT
Start: 2024-05-01 | End: 2024-05-03

## 2024-05-01 RX ORDER — ACETAMINOPHEN 500 MG
1000 TABLET ORAL ONCE
Refills: 0 | Status: COMPLETED | OUTPATIENT
Start: 2024-05-02 | End: 2024-05-02

## 2024-05-01 RX ORDER — TIMOLOL 0.5 %
1 DROPS OPHTHALMIC (EYE)
Refills: 0 | DISCHARGE

## 2024-05-01 RX ORDER — OXYCODONE HYDROCHLORIDE 5 MG/1
5 TABLET ORAL ONCE
Refills: 0 | Status: DISCONTINUED | OUTPATIENT
Start: 2024-05-01 | End: 2024-05-01

## 2024-05-01 RX ORDER — CEFAZOLIN SODIUM 1 G
2000 VIAL (EA) INJECTION EVERY 8 HOURS
Refills: 0 | Status: DISCONTINUED | OUTPATIENT
Start: 2024-05-01 | End: 2024-05-01

## 2024-05-01 RX ORDER — ASPIRIN/CALCIUM CARB/MAGNESIUM 324 MG
81 TABLET ORAL DAILY
Refills: 0 | Status: DISCONTINUED | OUTPATIENT
Start: 2024-05-01 | End: 2024-05-03

## 2024-05-01 RX ORDER — SIMVASTATIN 20 MG/1
1 TABLET, FILM COATED ORAL
Refills: 0 | DISCHARGE

## 2024-05-01 RX ORDER — LOSARTAN POTASSIUM 100 MG/1
1 TABLET, FILM COATED ORAL
Refills: 0 | DISCHARGE

## 2024-05-01 RX ORDER — POLYETHYLENE GLYCOL 3350 17 G/17G
17 POWDER, FOR SOLUTION ORAL AT BEDTIME
Refills: 0 | Status: DISCONTINUED | OUTPATIENT
Start: 2024-05-01 | End: 2024-05-03

## 2024-05-01 RX ORDER — SENNA PLUS 8.6 MG/1
2 TABLET ORAL AT BEDTIME
Refills: 0 | Status: DISCONTINUED | OUTPATIENT
Start: 2024-05-01 | End: 2024-05-03

## 2024-05-01 RX ORDER — OXYCODONE HYDROCHLORIDE 5 MG/1
5 TABLET ORAL
Refills: 0 | Status: DISCONTINUED | OUTPATIENT
Start: 2024-05-01 | End: 2024-05-03

## 2024-05-01 RX ORDER — SODIUM CHLORIDE 9 MG/ML
500 INJECTION, SOLUTION INTRAVENOUS ONCE
Refills: 0 | Status: COMPLETED | OUTPATIENT
Start: 2024-05-01 | End: 2024-05-01

## 2024-05-01 RX ORDER — CELECOXIB 200 MG/1
200 CAPSULE ORAL EVERY 12 HOURS
Refills: 0 | Status: DISCONTINUED | OUTPATIENT
Start: 2024-05-02 | End: 2024-05-03

## 2024-05-01 RX ORDER — TRAMADOL HYDROCHLORIDE 50 MG/1
50 TABLET ORAL ONCE
Refills: 0 | Status: DISCONTINUED | OUTPATIENT
Start: 2024-05-01 | End: 2024-05-01

## 2024-05-01 RX ORDER — HYDROMORPHONE HYDROCHLORIDE 2 MG/ML
0.5 INJECTION INTRAMUSCULAR; INTRAVENOUS; SUBCUTANEOUS
Refills: 0 | Status: DISCONTINUED | OUTPATIENT
Start: 2024-05-01 | End: 2024-05-01

## 2024-05-01 RX ORDER — ONDANSETRON 8 MG/1
4 TABLET, FILM COATED ORAL EVERY 6 HOURS
Refills: 0 | Status: DISCONTINUED | OUTPATIENT
Start: 2024-05-01 | End: 2024-05-03

## 2024-05-01 RX ORDER — PANTOPRAZOLE SODIUM 20 MG/1
40 TABLET, DELAYED RELEASE ORAL ONCE
Refills: 0 | Status: COMPLETED | OUTPATIENT
Start: 2024-05-01 | End: 2024-05-01

## 2024-05-01 RX ORDER — SODIUM CHLORIDE 9 MG/ML
500 INJECTION, SOLUTION INTRAVENOUS ONCE
Refills: 0 | Status: COMPLETED | OUTPATIENT
Start: 2024-05-01 | End: 2024-05-02

## 2024-05-01 RX ORDER — ACETAMINOPHEN 500 MG
1000 TABLET ORAL ONCE
Refills: 0 | Status: COMPLETED | OUTPATIENT
Start: 2024-05-01 | End: 2024-05-02

## 2024-05-01 RX ORDER — ATORVASTATIN CALCIUM 80 MG/1
10 TABLET, FILM COATED ORAL AT BEDTIME
Refills: 0 | Status: DISCONTINUED | OUTPATIENT
Start: 2024-05-01 | End: 2024-05-03

## 2024-05-01 RX ORDER — ATENOLOL 25 MG/1
1 TABLET ORAL
Refills: 0 | DISCHARGE

## 2024-05-01 RX ORDER — OXYCODONE HYDROCHLORIDE 5 MG/1
10 TABLET ORAL
Refills: 0 | Status: DISCONTINUED | OUTPATIENT
Start: 2024-05-01 | End: 2024-05-03

## 2024-05-01 RX ORDER — ACETAMINOPHEN 500 MG
1000 TABLET ORAL EVERY 8 HOURS
Refills: 0 | Status: DISCONTINUED | OUTPATIENT
Start: 2024-05-02 | End: 2024-05-03

## 2024-05-01 RX ORDER — KETOROLAC TROMETHAMINE 30 MG/ML
15 SYRINGE (ML) INJECTION EVERY 6 HOURS
Refills: 0 | Status: DISCONTINUED | OUTPATIENT
Start: 2024-05-01 | End: 2024-05-02

## 2024-05-01 RX ORDER — NIFEDIPINE 30 MG
90 TABLET, EXTENDED RELEASE 24 HR ORAL DAILY
Refills: 0 | Status: DISCONTINUED | OUTPATIENT
Start: 2024-05-01 | End: 2024-05-03

## 2024-05-01 RX ORDER — TRAMADOL HYDROCHLORIDE 50 MG/1
50 TABLET ORAL EVERY 6 HOURS
Refills: 0 | Status: DISCONTINUED | OUTPATIENT
Start: 2024-05-01 | End: 2024-05-03

## 2024-05-01 RX ORDER — ONDANSETRON 8 MG/1
4 TABLET, FILM COATED ORAL ONCE
Refills: 0 | Status: COMPLETED | OUTPATIENT
Start: 2024-05-01 | End: 2024-05-01

## 2024-05-01 RX ORDER — FAMOTIDINE 10 MG/ML
20 INJECTION INTRAVENOUS DAILY
Refills: 0 | Status: DISCONTINUED | OUTPATIENT
Start: 2024-05-01 | End: 2024-05-03

## 2024-05-01 RX ORDER — LOSARTAN POTASSIUM 100 MG/1
50 TABLET, FILM COATED ORAL DAILY
Refills: 0 | Status: DISCONTINUED | OUTPATIENT
Start: 2024-05-01 | End: 2024-05-03

## 2024-05-01 RX ORDER — NIFEDIPINE 30 MG
1 TABLET, EXTENDED RELEASE 24 HR ORAL
Refills: 0 | DISCHARGE

## 2024-05-01 RX ORDER — MAGNESIUM HYDROXIDE 400 MG/1
30 TABLET, CHEWABLE ORAL DAILY
Refills: 0 | Status: DISCONTINUED | OUTPATIENT
Start: 2024-05-01 | End: 2024-05-03

## 2024-05-01 RX ADMIN — PANTOPRAZOLE SODIUM 40 MILLIGRAM(S): 20 TABLET, DELAYED RELEASE ORAL at 13:49

## 2024-05-01 RX ADMIN — ATORVASTATIN CALCIUM 10 MILLIGRAM(S): 80 TABLET, FILM COATED ORAL at 23:22

## 2024-05-01 RX ADMIN — SODIUM CHLORIDE 30 MILLILITER(S): 9 INJECTION, SOLUTION INTRAVENOUS at 13:50

## 2024-05-01 RX ADMIN — SODIUM CHLORIDE 500 MILLILITER(S): 9 INJECTION, SOLUTION INTRAVENOUS at 19:56

## 2024-05-01 RX ADMIN — ONDANSETRON 4 MILLIGRAM(S): 8 TABLET, FILM COATED ORAL at 22:51

## 2024-05-01 RX ADMIN — Medication 100 MILLIGRAM(S): at 23:23

## 2024-05-01 RX ADMIN — TRAMADOL HYDROCHLORIDE 50 MILLIGRAM(S): 50 TABLET ORAL at 13:49

## 2024-05-01 RX ADMIN — Medication 15 MILLIGRAM(S): at 23:22

## 2024-05-01 RX ADMIN — CHLORHEXIDINE GLUCONATE 1 APPLICATION(S): 213 SOLUTION TOPICAL at 13:49

## 2024-05-01 RX ADMIN — SODIUM CHLORIDE 500 MILLILITER(S): 9 INJECTION, SOLUTION INTRAVENOUS at 23:19

## 2024-05-01 NOTE — PATIENT PROFILE ADULT - FALL HARM RISK - PATIENT NEEDS ASSISTANCE
c/o seizure yesterday and today, c/o right arm pain and neck pain, pt states he ran out of one seizure medication, pt states he had one beer today No assistance needed

## 2024-05-01 NOTE — PROGRESS NOTE ADULT - SUBJECTIVE AND OBJECTIVE BOX
Orthopedics Post-Op Check:  Patient was seen and examined at bedside. Denies CP/SOB/Dizziness/N/V/D/HA. Pain is well controlled at the moment.    Vital Signs Last 24 Hrs  T(C): 36.3 (01 May 2024 19:15), Max: 36.5 (01 May 2024 13:39)  T(F): 97.3 (01 May 2024 19:15), Max: 97.7 (01 May 2024 13:39)  HR: 78 (01 May 2024 19:45) (78 - 90)  BP: 152/84 (01 May 2024 19:45) (132/76 - 162/91)  BP(mean): 100 (01 May 2024 19:45) (79 - 100)  RR: 16 (01 May 2024 19:45) (16 - 25)  SpO2: 100% (01 May 2024 19:45) (96% - 100%)    Parameters below as of 01 May 2024 19:15  Patient On (Oxygen Delivery Method): mask, simple face  O2 Flow (L/min): 4    Labs: FU AM     Physical Exam:  Gen: NAD  RLE:   Dressing C/D/I. Prevena in place.   Motor intact + EHL/FHL/TA/GS. Sensation is grossly intact.   Compartments are soft, extremities are warm, DP 2+

## 2024-05-01 NOTE — PATIENT PROFILE ADULT - NSPROGENOTHERPROVIDER_GEN_A_NUR
Pre-admit appointment completed.  Pt instructed to continue regularly prescribed medications through the day before surgery. Pt instructed to take the following medications the day of surgery with a sip of water, per anesthesia protocol;  toprol, thyroid, and if needed-tylenol.     Pt to bring CPAP DOS.  
none

## 2024-05-01 NOTE — PROGRESS NOTE ADULT - ASSESSMENT
A/P: 63y y/o Female s/p Right total knee arthroplasty, POD #0  - Pain control  - Antibiotic - Ancef postop  - Incentive Spirometry  - DVT prophylaxis: Venodynes/Aspirin 81mg BID  - F/U AM Labs  - PT/OT/WBAT  - Notify Orthopedics with any questions

## 2024-05-02 ENCOUNTER — TRANSCRIPTION ENCOUNTER (OUTPATIENT)
Age: 64
End: 2024-05-02

## 2024-05-02 LAB
ANION GAP SERPL CALC-SCNC: 12 MMOL/L — SIGNIFICANT CHANGE UP (ref 7–14)
BUN SERPL-MCNC: 21 MG/DL — SIGNIFICANT CHANGE UP (ref 7–23)
CALCIUM SERPL-MCNC: 9.2 MG/DL — SIGNIFICANT CHANGE UP (ref 8.4–10.5)
CHLORIDE SERPL-SCNC: 99 MMOL/L — SIGNIFICANT CHANGE UP (ref 98–107)
CO2 SERPL-SCNC: 25 MMOL/L — SIGNIFICANT CHANGE UP (ref 22–31)
CREAT SERPL-MCNC: 1.19 MG/DL — SIGNIFICANT CHANGE UP (ref 0.5–1.3)
EGFR: 51 ML/MIN/1.73M2 — LOW
GLUCOSE SERPL-MCNC: 91 MG/DL — SIGNIFICANT CHANGE UP (ref 70–99)
HCT VFR BLD CALC: 31.8 % — LOW (ref 34.5–45)
HGB BLD-MCNC: 10.3 G/DL — LOW (ref 11.5–15.5)
MCHC RBC-ENTMCNC: 30.6 PG — SIGNIFICANT CHANGE UP (ref 27–34)
MCHC RBC-ENTMCNC: 32.4 GM/DL — SIGNIFICANT CHANGE UP (ref 32–36)
MCV RBC AUTO: 94.4 FL — SIGNIFICANT CHANGE UP (ref 80–100)
NRBC # BLD: 0 /100 WBCS — SIGNIFICANT CHANGE UP (ref 0–0)
NRBC # FLD: 0 K/UL — SIGNIFICANT CHANGE UP (ref 0–0)
PLATELET # BLD AUTO: 244 K/UL — SIGNIFICANT CHANGE UP (ref 150–400)
POTASSIUM SERPL-MCNC: 5 MMOL/L — SIGNIFICANT CHANGE UP (ref 3.5–5.3)
POTASSIUM SERPL-SCNC: 5 MMOL/L — SIGNIFICANT CHANGE UP (ref 3.5–5.3)
RBC # BLD: 3.37 M/UL — LOW (ref 3.8–5.2)
RBC # FLD: 12 % — SIGNIFICANT CHANGE UP (ref 10.3–14.5)
SODIUM SERPL-SCNC: 136 MMOL/L — SIGNIFICANT CHANGE UP (ref 135–145)
WBC # BLD: 9.08 K/UL — SIGNIFICANT CHANGE UP (ref 3.8–10.5)
WBC # FLD AUTO: 9.08 K/UL — SIGNIFICANT CHANGE UP (ref 3.8–10.5)

## 2024-05-02 PROCEDURE — 99222 1ST HOSP IP/OBS MODERATE 55: CPT

## 2024-05-02 RX ORDER — TIMOLOL 0.5 %
1 DROPS OPHTHALMIC (EYE)
Refills: 0 | Status: DISCONTINUED | OUTPATIENT
Start: 2024-05-02 | End: 2024-05-03

## 2024-05-02 RX ADMIN — Medication 1000 MILLIGRAM(S): at 08:37

## 2024-05-02 RX ADMIN — PANTOPRAZOLE SODIUM 40 MILLIGRAM(S): 20 TABLET, DELAYED RELEASE ORAL at 07:13

## 2024-05-02 RX ADMIN — CELECOXIB 200 MILLIGRAM(S): 200 CAPSULE ORAL at 08:37

## 2024-05-02 RX ADMIN — FAMOTIDINE 20 MILLIGRAM(S): 10 INJECTION INTRAVENOUS at 11:57

## 2024-05-02 RX ADMIN — Medication 15 MILLIGRAM(S): at 12:50

## 2024-05-02 RX ADMIN — CELECOXIB 200 MILLIGRAM(S): 200 CAPSULE ORAL at 19:02

## 2024-05-02 RX ADMIN — Medication 15 MILLIGRAM(S): at 11:57

## 2024-05-02 RX ADMIN — Medication 1000 MILLIGRAM(S): at 14:40

## 2024-05-02 RX ADMIN — CELECOXIB 200 MILLIGRAM(S): 200 CAPSULE ORAL at 07:13

## 2024-05-02 RX ADMIN — SODIUM CHLORIDE 500 MILLILITER(S): 9 INJECTION, SOLUTION INTRAVENOUS at 07:11

## 2024-05-02 RX ADMIN — SENNA PLUS 2 TABLET(S): 8.6 TABLET ORAL at 21:28

## 2024-05-02 RX ADMIN — Medication 1000 MILLIGRAM(S): at 13:40

## 2024-05-02 RX ADMIN — OXYCODONE HYDROCHLORIDE 5 MILLIGRAM(S): 5 TABLET ORAL at 13:40

## 2024-05-02 RX ADMIN — Medication 400 MILLIGRAM(S): at 00:25

## 2024-05-02 RX ADMIN — Medication 1000 MILLIGRAM(S): at 07:16

## 2024-05-02 RX ADMIN — Medication 1000 MILLIGRAM(S): at 21:27

## 2024-05-02 RX ADMIN — Medication 1000 MILLIGRAM(S): at 22:17

## 2024-05-02 RX ADMIN — Medication 81 MILLIGRAM(S): at 11:57

## 2024-05-02 RX ADMIN — ATENOLOL 100 MILLIGRAM(S): 25 TABLET ORAL at 07:12

## 2024-05-02 RX ADMIN — Medication 100 MILLIGRAM(S): at 07:12

## 2024-05-02 RX ADMIN — LOSARTAN POTASSIUM 50 MILLIGRAM(S): 100 TABLET, FILM COATED ORAL at 07:13

## 2024-05-02 RX ADMIN — Medication 15 MILLIGRAM(S): at 08:38

## 2024-05-02 RX ADMIN — Medication 15 MILLIGRAM(S): at 07:13

## 2024-05-02 RX ADMIN — Medication 15 MILLIGRAM(S): at 17:05

## 2024-05-02 RX ADMIN — Medication 100 MILLIGRAM(S): at 17:06

## 2024-05-02 RX ADMIN — Medication 15 MILLIGRAM(S): at 00:38

## 2024-05-02 RX ADMIN — Medication 15 MILLIGRAM(S): at 18:05

## 2024-05-02 RX ADMIN — ATORVASTATIN CALCIUM 10 MILLIGRAM(S): 80 TABLET, FILM COATED ORAL at 21:28

## 2024-05-02 RX ADMIN — OXYCODONE HYDROCHLORIDE 5 MILLIGRAM(S): 5 TABLET ORAL at 14:40

## 2024-05-02 RX ADMIN — Medication 1 DROP(S): at 17:05

## 2024-05-02 NOTE — PHYSICAL THERAPY INITIAL EVALUATION ADULT - PERTINENT HX OF CURRENT PROBLEM, REHAB EVAL
63 yr old female with preop dx of unilateral primary osteoarthritis right knee presents to have PST eval and is scheduled for right total knee arthroplasty with renu.

## 2024-05-02 NOTE — DISCHARGE NOTE PROVIDER - NSDCCPTREATMENT_GEN_ALL_CORE_FT
PRINCIPAL PROCEDURE  Procedure: Total knee arthroplasty  Findings and Treatment: Pain control:        -Acetaminophen 500mg - 2 tabs every 8 hours  As needed:        -Tramadol 50mg - 1 tab every 6 hours - Take only if needed for MODERATE pain       -oxycodone 5mg - 1 tab every 4-6 hours - Take only if needed for SEVERE or BREAKTHROUGH pain  Oxycodone and Tramadol have been sent to your pharmacy. Please do not drive, operate machinery, or make important decisions while taking these medications.   Other Medications:  -Aspirin (Enteric Coated) 81mg every 12 hours - to prevent blood clots (for 6 weeks post operatively.)  -Protonix 40mg - 1 tab every 24 hours - to prevent stomach irritation/ulcers  -Senna 8.6mg - 2 pills every 24 hours - stool softener  -Miralax 17g - daily - constipation   Follow up: Please follow up at your prescheduled post-operative follow up appointment with Dr. Robison after hospital discharge. Please call with any questions or concerns including fevers, worsening pain, pus from the wounds, redness of the skin and difficulty breathing or heaviness in the chest at [phone].

## 2024-05-02 NOTE — DISCHARGE NOTE PROVIDER - CARE PROVIDER_API CALL
Caden Robison  Orthopaedic Surgery  [address] [place] [address], [address]  [place] [address]  Phone: [phone]  Fax: [phone]  Follow Up Time:

## 2024-05-02 NOTE — CONSULT NOTE ADULT - SUBJECTIVE AND OBJECTIVE BOX
Patient is a 63y old  Female who presents with a chief complaint of s/p right TKA (01 May 2024 20:13)      HPI:  63 yr old female with preop dx of unilateral primary osteoarthritis right knee presents to have PST ermias and is scheduled for right total knee arthroplasty with renu.    Patient presented to the office for evaluation of her bilateral knee pain. Patient has been experiencing bilateral (right greater than left) knee pain for years. Pain is located over the anterior and posterolateral knee. It can radiate to the tibias. Patient can have back pain that radiates down the legs. She has tried anti-inflammatories, but it increased her creatinine. She now takes Tylenol, without significant relief. Patient has tried physical therapy, which initially helped. She has tried corticosteroid injections, which helped for about 4 weeks. Her last injection was in December. Patient states she had bilateral knee pain for a while and has gotten worse over the past two years right>left. (2024 07:36)    s/p total knee arthroplasty on 24     REVIEW OF SYSTEMS  Constitutional - No fever, No weight loss, No fatigue  HEENT - No eye pain, No visual disturbances, No difficulty hearing, No tinnitus, No vertigo, No neck pain  Respiratory - No cough, No wheezing, No shortness of breath  Cardiovascular - No chest pain, No palpitations  Gastrointestinal - No abdominal pain, No nausea, No vomiting, No diarrhea, No constipation  Genitourinary - No dysuria, No frequency, No hematuria, No incontinence  Neurological - No headaches, No memory loss, No loss of strength, No numbness, No tremors  Skin - No itching, No rashes, No lesions   Endocrine - No temperature intolerance  Musculoskeletal - + joint pain, No joint swelling, No muscle pain  Psychiatric - No depression, No anxiety    PAST MEDICAL & SURGICAL HISTORY  Hypertension    Hyperlipidemia    Glaucoma    Cataract    S/P cataract surgery    H/O  section      SOCIAL HISTORY  Smoking - Denied  EtOH - occasional  Drugs - Denied    FUNCTIONAL HISTORY  Lives with her children  Independent    CURRENT FUNCTIONAL STATUS      FAMILY HISTORY   FH: hypertension (Father, Mother)        RECENT LABS/IMAGING  CBC Full  -  ( 02 May 2024 05:24 )  WBC Count : 9.08 K/uL  RBC Count : 3.37 M/uL  Hemoglobin : 10.3 g/dL  Hematocrit : 31.8 %  Platelet Count - Automated : 244 K/uL  Mean Cell Volume : 94.4 fL  Mean Cell Hemoglobin : 30.6 pg  Mean Cell Hemoglobin Concentration : 32.4 gm/dL  Auto Neutrophil # : x  Auto Lymphocyte # : x  Auto Monocyte # : x  Auto Eosinophil # : x  Auto Basophil # : x  Auto Neutrophil % : x  Auto Lymphocyte % : x  Auto Monocyte % : x  Auto Eosinophil % : x  Auto Basophil % : x        136  |  99  |  21  ----------------------------<  91  5.0   |  25  |  1.19    Ca    9.2      02 May 2024 05:24      Urinalysis Basic - ( 02 May 2024 05:24 )    Color: x / Appearance: x / SG: x / pH: x  Gluc: 91 mg/dL / Ketone: x  / Bili: x / Urobili: x   Blood: x / Protein: x / Nitrite: x   Leuk Esterase: x / RBC: x / WBC x   Sq Epi: x / Non Sq Epi: x / Bacteria: x        VITALS  T(C): 36.9 (24 @ 10:03), Max: 37 (24 @ 07:06)  HR: 75 (24 @ 10:03) (71 - 90)  BP: 145/74 (24 @ 10:03) (132/76 - 162/91)  RR: 18 (24 @ 10:03) (15 - 25)  SpO2: 100% (24 @ 10:03) (94% - 100%)  Wt(kg): --    ALLERGIES  No Known Drug Allergies  pork (Vomiting)      MEDICATIONS   acetaminophen     Tablet .. 1000 milliGRAM(s) Oral every 8 hours  acetaminophen   IVPB .. 1000 milliGRAM(s) IV Intermittent once  aspirin enteric coated 81 milliGRAM(s) Oral daily  atenolol  Tablet 100 milliGRAM(s) Oral daily  atorvastatin 10 milliGRAM(s) Oral at bedtime  ceFAZolin   IVPB 2000 milliGRAM(s) IV Intermittent every 8 hours  celecoxib 200 milliGRAM(s) Oral every 12 hours  chlorhexidine 2% Cloths 1 Application(s) Topical daily  famotidine    Tablet 20 milliGRAM(s) Oral daily  ketorolac   Injectable 15 milliGRAM(s) IV Push every 6 hours  lactated ringers. 1000 milliLiter(s) IV Continuous <Continuous>  losartan 50 milliGRAM(s) Oral daily  magnesium hydroxide Suspension 30 milliLiter(s) Oral daily PRN  NIFEdipine XL 90 milliGRAM(s) Oral daily  ondansetron Injectable 4 milliGRAM(s) IV Push every 6 hours PRN  oxyCODONE    IR 5 milliGRAM(s) Oral every 3 hours PRN  oxyCODONE    IR 10 milliGRAM(s) Oral every 3 hours PRN  pantoprazole    Tablet 40 milliGRAM(s) Oral before breakfast  polyethylene glycol 3350 17 Gram(s) Oral at bedtime  senna 2 Tablet(s) Oral at bedtime  traMADol 50 milliGRAM(s) Oral every 6 hours PRN      ----------------------------------------------------------------------------------------  PHYSICAL EXAM  Constitutional - NAD, Comfortable  HEENT - NCAT, EOMI  Neck - Supple, No limited ROM  Chest - no respiratory distress  Cardiovascular - RRR, S1S2   Abdomen -  Soft, NTND  Extremities -    Neurologic Exam -                    Cognitive - Awake, Alert, AAO to self, place, date, year, situation     Communication - Fluent, No dysarthria     Cranial Nerves - CN 2-12 intact     Motor - No focal deficits                    LEFT    UE - ShAB 5/5, EF 5/5, EE 5/5, WE 5/5,  5/5                    RIGHT UE - ShAB 5/5, EF 5/5, EE 5/5, WE 5/5,  5/5                    LEFT    LE - HF 5/5, KE 5/5, DF 5/5, PF 5/5                    RIGHT LE - HF 5/5, KE 5/5, DF 5/5, PF 5/5        Sensory - Intact to LT     Reflexes - DTR Intact, No primitive reflexive     Coordination - FTN intact   Psychiatric - Mood stable, Affect WNL  ----------------------------------------------------------------------------------------  ASSESSMENT/PLAN  63 year old female s/p R TKA on 24  continue bedside PT and OT  WBAT  Pain -celebrex, acetaminophen, tramadol prn  diet regular  DVT PPX - scd  preveena    Rehab -  incomplete, consult in progress Patient is a 63y old  Female who presents with a chief complaint of s/p right TKA (01 May 2024 20:13)      HPI:  63 yr old female with preop dx of unilateral primary osteoarthritis right knee presents to have PST ermias and is scheduled for right total knee arthroplasty with renu.    Patient presented to the office for evaluation of her bilateral knee pain. Patient has been experiencing bilateral (right greater than left) knee pain for years. Pain is located over the anterior and posterolateral knee. It can radiate to the tibias. Patient can have back pain that radiates down the legs. She has tried anti-inflammatories, but it increased her creatinine. She now takes Tylenol, without significant relief. Patient has tried physical therapy, which initially helped. She has tried corticosteroid injections, which helped for about 4 weeks. Her last injection was in December. Patient states she had bilateral knee pain for a while and has gotten worse over the past two years right>left. (2024 07:36)    s/p total knee arthroplasty on 24     REVIEW OF SYSTEMS  Constitutional - No fever, No weight loss, No fatigue  HEENT - No eye pain, No visual disturbances, No difficulty hearing, No tinnitus, No vertigo, No neck pain  Respiratory - No cough, No wheezing, No shortness of breath  Cardiovascular - No chest pain, No palpitations  Gastrointestinal - No abdominal pain, No nausea, No vomiting, No diarrhea, No constipation  Genitourinary - No dysuria, No frequency, No hematuria, No incontinence  Neurological - No headaches, No memory loss, + loss of strength, No numbness, No tremors  Skin - No itching, No rashes, No lesions   Endocrine - No temperature intolerance  Musculoskeletal - + joint pain, No joint swelling, + muscle pain  Psychiatric - No depression, No anxiety    PAST MEDICAL & SURGICAL HISTORY  Hypertension    Hyperlipidemia    Glaucoma    Cataract    S/P cataract surgery    H/O  section      SOCIAL HISTORY  Smoking - Denied  EtOH - occasional  Drugs - Denied    FUNCTIONAL HISTORY  Lives with her children in house with 3 steps to enter, 12 steps to enter  Independent at baseline without device    CURRENT FUNCTIONAL STATUS  seen ambulating with PT, climbed 4 steps.  pain limited, note pending    FAMILY HISTORY   FH: hypertension (Father, Mother)        RECENT LABS/IMAGING  CBC Full  -  ( 02 May 2024 05:24 )  WBC Count : 9.08 K/uL  RBC Count : 3.37 M/uL  Hemoglobin : 10.3 g/dL  Hematocrit : 31.8 %  Platelet Count - Automated : 244 K/uL  Mean Cell Volume : 94.4 fL  Mean Cell Hemoglobin : 30.6 pg  Mean Cell Hemoglobin Concentration : 32.4 gm/dL  Auto Neutrophil # : x  Auto Lymphocyte # : x  Auto Monocyte # : x  Auto Eosinophil # : x  Auto Basophil # : x  Auto Neutrophil % : x  Auto Lymphocyte % : x  Auto Monocyte % : x  Auto Eosinophil % : x  Auto Basophil % : x        136  |  99  |  21  ----------------------------<  91  5.0   |  25  |  1.19    Ca    9.2      02 May 2024 05:24      Urinalysis Basic - ( 02 May 2024 05:24 )    Color: x / Appearance: x / SG: x / pH: x  Gluc: 91 mg/dL / Ketone: x  / Bili: x / Urobili: x   Blood: x / Protein: x / Nitrite: x   Leuk Esterase: x / RBC: x / WBC x   Sq Epi: x / Non Sq Epi: x / Bacteria: x        VITALS  T(C): 36.9 (24 @ 10:03), Max: 37 (24 @ 07:06)  HR: 75 (24 @ 10:03) (71 - 90)  BP: 145/74 (24 @ 10:03) (132/76 - 162/91)  RR: 18 (24 @ 10:03) (15 - 25)  SpO2: 100% (24 @ 10:03) (94% - 100%)  Wt(kg): --    ALLERGIES  No Known Drug Allergies  pork (Vomiting)      MEDICATIONS   acetaminophen     Tablet .. 1000 milliGRAM(s) Oral every 8 hours  acetaminophen   IVPB .. 1000 milliGRAM(s) IV Intermittent once  aspirin enteric coated 81 milliGRAM(s) Oral daily  atenolol  Tablet 100 milliGRAM(s) Oral daily  atorvastatin 10 milliGRAM(s) Oral at bedtime  ceFAZolin   IVPB 2000 milliGRAM(s) IV Intermittent every 8 hours  celecoxib 200 milliGRAM(s) Oral every 12 hours  chlorhexidine 2% Cloths 1 Application(s) Topical daily  famotidine    Tablet 20 milliGRAM(s) Oral daily  ketorolac   Injectable 15 milliGRAM(s) IV Push every 6 hours  lactated ringers. 1000 milliLiter(s) IV Continuous <Continuous>  losartan 50 milliGRAM(s) Oral daily  magnesium hydroxide Suspension 30 milliLiter(s) Oral daily PRN  NIFEdipine XL 90 milliGRAM(s) Oral daily  ondansetron Injectable 4 milliGRAM(s) IV Push every 6 hours PRN  oxyCODONE    IR 5 milliGRAM(s) Oral every 3 hours PRN  oxyCODONE    IR 10 milliGRAM(s) Oral every 3 hours PRN  pantoprazole    Tablet 40 milliGRAM(s) Oral before breakfast  polyethylene glycol 3350 17 Gram(s) Oral at bedtime  senna 2 Tablet(s) Oral at bedtime  traMADol 50 milliGRAM(s) Oral every 6 hours PRN      ----------------------------------------------------------------------------------------  PHYSICAL EXAM  Constitutional - NAD, Comfortable  HEENT - NCAT, EOMI  Neck - Supple, No limited ROM  Chest - no respiratory distress  Cardiovascular - RRR, S1S2   Abdomen -  Soft, NTND  Extremities -  R preveena in place  Neurologic Exam -                    Cognitive - Awake, Alert, AAO to self, place, date, year, situation     Communication - Fluent, No dysarthria     Cranial Nerves - CN 2-12 intact     Motor -                      LEFT    UE - ShAB 5/5, EF 5/5, EE 5/5, WE 5/5,  5/5                    RIGHT UE - ShAB 5/5, EF 5/5, EE 5/5, WE 5/5,  5/5                    LEFT    LE - HF 5/5, KE 5/5, DF 5/5, PF 5/5                    RIGHT LE - HF 4-/5, KE 4-/5, DF 4+/5, PF 4+/5        Sensory - Intact to LT   Psychiatric - Mood stable, Affect WNL  ----------------------------------------------------------------------------------------  ASSESSMENT/PLAN  63 year old female s/p R TKA on 24  continue bedside PT and OT  WBAT  Pain -celebrex, acetaminophen, tramadol prn  diet regular  DVT PPX - scd  preveena    Rehab -  continue bedside therapy, goal is for discharge home however if not able to climb stairs needed for discharge home, recommend subacute rehab

## 2024-05-02 NOTE — DISCHARGE NOTE NURSING/CASE MANAGEMENT/SOCIAL WORK - NSDCDMENAME_GEN_ALL_CORE_FT
Prior to this hospitalization you received home delivery of a rolling walker from [facility] [place] ([phone]).

## 2024-05-02 NOTE — DISCHARGE NOTE PROVIDER - NSDCMRMEDTOKEN_GEN_ALL_CORE_FT
atenolol 100 mg oral tablet: 1 tab(s) orally once a day am  losartan 50 mg oral tablet: 1 tab(s) orally once a day am  NIFEdipine (Eqv-Adalat CC) 90 mg oral tablet, extended release: 1 tab(s) orally once a day pm  simvastatin 20 mg oral tablet: 1 tab(s) orally once a day am  timolol hemihydrate 0.5% ophthalmic solution: 1 drop(s) in each affected eye 2 times a day

## 2024-05-02 NOTE — PHYSICAL THERAPY INITIAL EVALUATION ADULT - RANGE OF MOTION EXAMINATION, REHAB EVAL
R knee ROM grossly 45 degrees/bilateral upper extremity ROM was WFL (within functional limits)/bilateral lower extremity ROM was WFL (within functional limits)

## 2024-05-02 NOTE — DISCHARGE NOTE NURSING/CASE MANAGEMENT/SOCIAL WORK - NSDCPNINST_GEN_ALL_CORE
Please call Dr. Robison's office to schedule an appointment for the provena vac to be removed. He wants to see you on Tuesday, May 7, 2024 for the removal. You also have a post op appointment with Dr. Robison on May 16, 2024 @ 11:45am in the [address] [place] [address] office. If you are unable to keep this appointment, please call the office to reschedule. Call MD if you develop a fever, or if there is redness, swelling, drainage or pain not relieved by pain medication. No heavy lifting, bending, or straining to move your bowels. Take over the counter stool softeners as needed to prevent constipation which may be caused by pain medication.

## 2024-05-02 NOTE — OCCUPATIONAL THERAPY INITIAL EVALUATION ADULT - PERTINENT HX OF CURRENT PROBLEM, REHAB EVAL
Pt is a 63 year old female with hx of bilateral knee pain, right greater than left knee pain for years without significant relief despite conservative measures. Pt with dx of Osteoarthritis of right knee. Pt is now s/p Right Total knee arthroplasty on 5/1/24.

## 2024-05-02 NOTE — PROGRESS NOTE ADULT - SUBJECTIVE AND OBJECTIVE BOX
S: Pt seen and examined. Doing well postoperatively. Pain well controlled. Denies N/V/CP/SOB. Has been OOB to bathroom, but slowly.      Daughter at bedside.     O:   PE:  Gen: NAD, laying comfortably in bed  Resp: Unlabored breathing  MSK: Dressing c/d/i          +EHL/FHL/TA/Gas/SOl          +DP/SP/Sharyn/Saph           2+DP      Vital Signs Last 24 Hrs  T(C): 36.4 (02 May 2024 01:34), Max: 36.6 (01 May 2024 22:52)  T(F): 97.6 (02 May 2024 01:34), Max: 97.8 (01 May 2024 22:52)  HR: 71 (02 May 2024 01:34) (71 - 90)  BP: 143/67 (02 May 2024 01:34) (132/76 - 162/91)  BP(mean): 101 (01 May 2024 21:00) (79 - 101)  RR: 18 (02 May 2024 01:34) (15 - 25)  SpO2: 100% (02 May 2024 01:34) (94% - 100%)    Parameters below as of 02 May 2024 01:34  Patient On (Oxygen Delivery Method): room air      I&O's Summary    01 May 2024 07:01  -  02 May 2024 06:54  --------------------------------------------------------  IN: 710 mL / OUT: 500 mL / NET: 210 mL        Assessment: s/p R TKA, progressing well.    Plan:    -Neuro: Multimodal pain control  -Resp: IS  -GI: reg diet  -MSK: OOB, WBAT, PT/OT  -Heme: DVT PPx  -Dispo: fam req rehab    Ortho S: Pt seen and examined. Doing well postoperatively. Pain well controlled. Denies N/V/CP/SOB. Has been OOB to bathroom, but slowly.      Daughter at bedside.     O:   PE:  Gen: NAD, laying comfortably in bed  Resp: Unlabored [id number]  MSK:           +EHL/FHL/TA/Gas/SOl          +DP/SP/Sharyn/Saph           2+DP  Prevena + suctioning     Vital Signs Last 24 Hrs  T(C): 36.4 (02 May 2024 01:34), Max: 36.6 (01 May 2024 22:52)  T(F): 97.6 (02 May 2024 01:34), Max: 97.8 (01 May 2024 22:52)  HR: 71 (02 May 2024 01:34) (71 - 90)  BP: 143/67 (02 May 2024 01:34) (132/76 - 162/91)  BP(mean): 101 (01 May 2024 21:00) (79 - 101)  RR: 18 (02 May 2024 01:34) (15 - 25)  SpO2: 100% (02 May 2024 01:34) (94% - 100%)    Parameters below as of 02 May 2024 01:34  Patient On (Oxygen Delivery Method): room air      I&O's Summary    01 May 2024 07:01  -  02 May 2024 06:54  --------------------------------------------------------  IN: 710 mL / OUT: 500 mL / NET: 210 mL        Assessment: s/p R TKA, progressing well.    Plan:    -Neuro: Multimodal pain control  -Resp: IS  -GI: reg diet  -MSK: OOB, WBAT, PT/OT  -Heme: DVT PPx  -Dispo: fam req rehab  -Prevena + in place  Ortho

## 2024-05-02 NOTE — CONSULT NOTE ADULT - ASSESSMENT
63F with hx of HTN, HLD, unilateral primary osteoarthritis right knee s/p right total knee arthroplasty on 5/1.

## 2024-05-02 NOTE — DISCHARGE NOTE PROVIDER - NSDCFUSCHEDAPPT_GEN_ALL_CORE_FT
Caden Robison  [facility] Physician [facility]  ORTHOSUR [address] [facility]  Scheduled Appointment: 05/16/2024

## 2024-05-02 NOTE — CONSULT NOTE ADULT - PROBLEM SELECTOR RECOMMENDATION 9
S/p right total knee arthroplasty on 5/1.   - care per primary orthopedic team   - multimodal pain control + bowel regimen   - PT/OT  - Encourage OOB   - Encourage incentive spirometry   - Prevena Vac per ortho   - DVT ppx: ASA 81mg daily   - Post operative labs reviewed

## 2024-05-02 NOTE — DISCHARGE NOTE PROVIDER - NSDCCPCAREPLAN_GEN_ALL_CORE_FT
PRINCIPAL DISCHARGE DIAGNOSIS  Diagnosis: Unilateral primary osteoarthritis, right knee  Assessment and Plan of Treatment: Diet: Continue regular diet upon discharge.   Activity: No heavy lifting > 25 lbs for 4 weeks. Avoid straining or excessive activity x 6 weeks.   -Continue to use your walker when ambulating until your postoperative follow up appointment.   Dressings: Keep dressing clean, dry, and intact. Your doctor will remove your bandage at your post-operative follow up appointment.   Other Care:   -You may shower when you get home but DO NOT soak dressing and/or incision. The water may run over your dressing/incision but DO NOT let the water directly hit your dressing/incision (take a shower with your wound away from the direct stream of water). NO hot tubes, NO bath tubs, NO swimming pools.   -Elevate your operative leg 2 feet above heart level for 2 hours in the morning, 2 hours in the afternoon, and 2 hours in the evening.   -Apply ice for 20min every time you elevate.   -Sit for 90 min/day: 45mins x2 or 30min x3  -DO NOT sit for more than the 90min/day. Walk or lay down when not elevating your leg.   -DO NOT place the elevation pillow behind your knees. Only place it under your calf and heel.   -DO NOT bend more than 45 degrees at the waist

## 2024-05-02 NOTE — PROGRESS NOTE ADULT - ATTENDING COMMENTS
Patient seen and examined. Oliva Kunz is a 63 year old female now status post Right Total Knee Arthroplasty. No acute events overnight.      Physical Exam:  Prevena Vac in placed  Motor: Intact EHL/FHL/Tibialis Anterior/Gastrocnemius  Sensory: Intact Superficial Peroneal/Deep Peroneal/Saphenous/Sural/Tibial Nerves  Vascular: 2+ DP Pulse      Assessment/Plan:  Oliva Kunz is a 63 year old female now status post Right Total Knee Arthroplasty.    Plan:  -Prevena Vac  -Right Lower Extremity: Weight Bearing as Tolerated  -PT/OT, Out of Bed  -Pain Control: Per Protocol  -DVT Prophylaxis: Aspirin 81mg twice per day  -Antibiotics: Ancef 2g x 24 hours then Duricef  -Disposition: Home

## 2024-05-02 NOTE — PHYSICAL THERAPY INITIAL EVALUATION ADULT - ADDITIONAL COMMENTS
Pt lives with daughter in home, 12 steps to bedroom, no recent falls, fully independent.   Patients Current SpO2: 99%

## 2024-05-02 NOTE — DISCHARGE NOTE NURSING/CASE MANAGEMENT/SOCIAL WORK - PATIENT PORTAL LINK FT
M[place] is here due to having redness, soreness and discharge from vaginal area that has been around for the past week.     Pre-visit Screening:  Immunizations:  up to date  Colonoscopy:  NA  Mammogram: NA  Asthma Action Test/Plan: No concerns  PHQ9:  NA  GAD7:  NA  Questioned patient about current smoking habits Pt. has never smoked.  Ok to leave detailed message on voice mail for today's visit only Yes, phone # [phone]       You can access the FollowMyHealth Patient Portal offered by [facility] by registering at the following website: [email or web address]. By joining [employer]’s FollowMyHealth portal, you will also be able to view your health information using other applications (apps) compatible with our system.

## 2024-05-02 NOTE — OCCUPATIONAL THERAPY INITIAL EVALUATION ADULT - GENERAL OBSERVATIONS, REHAB EVAL
Pt. received sitting at edge of bed with MD present. No acute distress. Patient agreed to evaluation from Occupational Therapist. +Clean dry intact dressing to Right Knee, +Wound VAC. O2 99%. Daughter bedside.

## 2024-05-02 NOTE — CONSULT NOTE ADULT - PROBLEM SELECTOR RECOMMENDATION 2
BP acceptable  - C/w home BP meds: Losartan, atenolol and nifedipine with hold parameters  - Monitor BP

## 2024-05-02 NOTE — OCCUPATIONAL THERAPY INITIAL EVALUATION ADULT - LIVES WITH, PROFILE
Pt. reports she lives with her daughter in a house with 3 steps to enter. Once inside, pt. reports she has full flight of steps to negotiate to 2nd floor where main bedroom and bathroom are located. Per pt., she has a bathtub in her bathroom.

## 2024-05-02 NOTE — DISCHARGE NOTE PROVIDER - HOSPITAL COURSE
This is a 64 yo female with PMH of HLD, HTN, cataract, glaucoma who presents to [facility] for orthopedic surgery. Patient s/p R renu TKA with Dr. Robison on 5/1/24. Patient tolerated the procedure well without any intraoperative complications. Patient tolerated physical therapy well, and the pain was controlled. Patient is weight bearing as tolerated with cane/walker as needed. Seen by medical attending for continuity of care and management and cleared for safe discharge. Keep dressing/incision clean, dry and intact. Your Prevena wound vac*** suture/staples to be removed on post-op day #14 at your office visit. Patient is on 81 mg of ASA for DVT prophylaxis, please take for 6 weeks unless otherwise instructed by your surgeon. Please follow up with Dr. Robison in 2 weeks, call the office to make an appointment, [phone]. Please follow up with your PMD for continuity of care and management as medications may have changed. This is a 62 yo female with PMH of HLD, HTN, cataract, glaucoma who presents to [facility] for orthopedic surgery. Patient s/p R renu TKA with Dr. Robison on 5/1/24. Patient tolerated the procedure well without any intraoperative complications. Patient tolerated physical therapy well, and the pain was controlled. Patient is weight bearing as tolerated with cane/walker as needed. Seen by medical attending for continuity of care and management and cleared for safe discharge. Keep dressing/incision clean, dry and intact. Your Prevena wound vac will be removed at your postop visit on 5/7, call for appointment. Patient is on 81 mg of ASA for DVT prophylaxis, please take for 6 weeks unless otherwise instructed by your surgeon. Please follow up with Dr. Robison in 2 weeks, call the office to make an appointment, [phone]. Please follow up with your PMD for continuity of care and management as medications may have changed.

## 2024-05-02 NOTE — OCCUPATIONAL THERAPY INITIAL EVALUATION ADULT - MD ORDER
Occupational Therapy (OT) to evaluate and treat. Occupational Therapy (OT) to evaluate and treat. Out of Bed to Chair. Per IRMA Sarkar, pt is okay to participate in OT evaluation and perform activity as tolerated.

## 2024-05-02 NOTE — PROGRESS NOTE ADULT - SUBJECTIVE AND OBJECTIVE BOX
ANESTHESIA POSTOP CHECK    63y Female POSTOP DAY 1 S/P right TKR    Vital Signs Last 24 Hrs  T(C): 36.9 (02 May 2024 10:03), Max: 37 (02 May 2024 07:06)  T(F): 98.4 (02 May 2024 10:03), Max: 98.6 (02 May 2024 07:06)  HR: 75 (02 May 2024 10:03) (71 - 90)  BP: 145/74 (02 May 2024 10:03) (132/76 - 162/91)  BP(mean): 101 (01 May 2024 21:00) (79 - 101)  RR: 18 (02 May 2024 10:03) (15 - 25)  SpO2: 100% (02 May 2024 10:03) (94% - 100%)    Parameters below as of 02 May 2024 10:03  Patient On (Oxygen Delivery Method): room air      I&O's Summary    01 May 2024 07:01  -  02 May 2024 07:00  --------------------------------------------------------  IN: 710 mL / OUT: 900 mL / NET: -190 mL    02 May 2024 07:01  -  02 May 2024 10:43  --------------------------------------------------------  IN: 0 mL / OUT: 300 mL / NET: -300 mL        [ x] NO APPARENT ANESTHESIA COMPLICATIONS      Comments:

## 2024-05-02 NOTE — CONSULT NOTE ADULT - SUBJECTIVE AND OBJECTIVE BOX
Patient is a 63y old  Female who presents with a chief complaint of R knee OA s/p R renu TKA (02 May 2024 12:04)      HPI:  63F with hx of HTN, HLD, unilateral primary osteoarthritis right knee presenting for right total knee arthroplasty.     Patient seen and examined POD #1, daughter at bedside. Reports overall she is recovering well but reports pain with working with PT. Denies chest pain, shortness of breath. Urinating without issues and passing flatus, but no BM yet. Denies nausea/vomiting. Tolerating regular diet.       Allergies:   No Known Drug Allergies  pork (Vomiting)      HOME MEDICATIONS: [X] Reviewed  · 	atenolol 100 mg oral tablet: Last Dose Taken:  , 1 tab(s) orally once a day am  · 	NIFEdipine (Eqv-Adalat CC) 90 mg oral tablet, extended release: Last Dose Taken:  , 1 tab(s) orally once a day pm  · 	simvastatin 20 mg oral tablet: Last Dose Taken:  , 1 tab(s) orally once a day am  · 	losartan 50 mg oral tablet: Last Dose Taken:  , 1 tab(s) orally once a day am  · 	timolol hemihydrate 0.5% ophthalmic solution: Last Dose Taken:  , 1 drop(s) in each affected eye 2 times a day      MEDICATIONS  (STANDING):  acetaminophen     Tablet .. 1000 milliGRAM(s) Oral every 8 hours  aspirin enteric coated 81 milliGRAM(s) Oral daily  atenolol  Tablet 100 milliGRAM(s) Oral daily  atorvastatin 10 milliGRAM(s) Oral at bedtime  ceFAZolin   IVPB 2000 milliGRAM(s) IV Intermittent every 8 hours  celecoxib 200 milliGRAM(s) Oral every 12 hours  chlorhexidine 2% Cloths 1 Application(s) Topical daily  famotidine    Tablet 20 milliGRAM(s) Oral daily  ketorolac   Injectable 15 milliGRAM(s) IV Push every 6 hours  lactated ringers. 1000 milliLiter(s) (30 mL/Hr) IV Continuous <Continuous>  losartan 50 milliGRAM(s) Oral daily  NIFEdipine XL 90 milliGRAM(s) Oral daily  pantoprazole    Tablet 40 milliGRAM(s) Oral before breakfast  polyethylene glycol 3350 17 Gram(s) Oral at bedtime  senna 2 Tablet(s) Oral at bedtime    MEDICATIONS  (PRN):  magnesium hydroxide Suspension 30 milliLiter(s) Oral daily PRN Constipation  ondansetron Injectable 4 milliGRAM(s) IV Push every 6 hours PRN Nausea and/or Vomiting  oxyCODONE    IR 10 milliGRAM(s) Oral every 3 hours PRN Severe Pain (7 - 10)  oxyCODONE    IR 5 milliGRAM(s) Oral every 3 hours PRN Moderate Pain (4 - 6)  traMADol 50 milliGRAM(s) Oral every 6 hours PRN Mild Pain (1 - 3)      PAST MEDICAL & SURGICAL HISTORY:  Hypertension  Hyperlipidemia  Glaucoma  Cataract  S/P cataract surgery  H/O  section  [X] Reviewed     SOCIAL HISTORY:  Residence: [ ] [facility]  [ ] [facility]  [X] Community  [ ] Substance abuse: denies   [ ] Tobacco: denies    [ ] Alcohol use: denies     FAMILY HISTORY:  FH: hypertension (Father, Mother)    Vital Signs Last 24 Hrs  T(C): 36.7 (02 May 2024 13:41), Max: 37 (02 May 2024 07:06)  T(F): 98.1 (02 May 2024 13:41), Max: 98.6 (02 May 2024 07:06)  HR: 83 (02 May 2024 13:41) (71 - 90)  BP: 122/74 (02 May 2024 13:41) (122/74 - 158/86)  BP(mean): 101 (01 May 2024 21:00) (79 - 101)  RR: 18 (02 May 2024 13:41) (15 - 25)  SpO2: 100% (02 May 2024 13:41) (94% - 100%)    Parameters below as of 02 May 2024 13:41  Patient On (Oxygen Delivery Method): room air    PHYSICAL EXAM:    GENERAL: obese female in NAD, well-groomed, well-developed  EYES: Conjunctiva and sclera clear  ENMT: Moist mucous membranes  RESPIRATORY: Clear to auscultation bilaterally; No rales, rhonchi, wheezing, or rubs  CARDIOVASCULAR: Regular rate and rhythm; No murmurs, rubs, or gallops  GASTROINTESTINAL: Soft, Nontender, Nondistended; Bowel sounds present  EXTREMITIES: R knee w/ Prevena Vac, compartments soft, sensation intact   NERVOUS SYSTEM:  Alert & Oriented X3; Moving all 4 extremities; No gross sensory deficits    LABS:                        10.3   9.08  )-----------( 244      ( 02 May 2024 05:24 )             31.8     Hemoglobin: 10.3 g/dL ( @ 05:24)        136  |  99  |  21  ----------------------------<  91  5.0   |  25  |  1.19    Ca    9.2      02 May 2024 05:24        Urinalysis Basic - ( 02 May 2024 05:24 )    Color: x / Appearance: x / SG: x / pH: x  Gluc: 91 mg/dL / Ketone: x  / Bili: x / Urobili: x   Blood: x / Protein: x / Nitrite: x   Leuk Esterase: x / RBC: x / WBC x   Sq Epi: x / Non Sq Epi: x / Bacteria: x      CAPILLARY BLOOD GLUCOSE      POCT Blood Glucose.: 86 mg/dL (01 May 2024 13:24)    [X] Care Discussed with Consultants/Other Providers: ortho PA

## 2024-05-03 VITALS
OXYGEN SATURATION: 100 % | SYSTOLIC BLOOD PRESSURE: 149 MMHG | DIASTOLIC BLOOD PRESSURE: 64 MMHG | HEART RATE: 71 BPM | RESPIRATION RATE: 17 BRPM | TEMPERATURE: 98 F

## 2024-05-03 PROCEDURE — 99232 SBSQ HOSP IP/OBS MODERATE 35: CPT

## 2024-05-03 PROCEDURE — 99231 SBSQ HOSP IP/OBS SF/LOW 25: CPT

## 2024-05-03 RX ORDER — SENNA PLUS 8.6 MG/1
2 TABLET ORAL
Qty: 0 | Refills: 0 | DISCHARGE
Start: 2024-05-03

## 2024-05-03 RX ORDER — PANTOPRAZOLE SODIUM 20 MG/1
1 TABLET, DELAYED RELEASE ORAL
Qty: 42 | Refills: 0
Start: 2024-05-03 | End: 2024-06-13

## 2024-05-03 RX ORDER — TRAMADOL HYDROCHLORIDE 50 MG/1
1 TABLET ORAL
Qty: 28 | Refills: 0
Start: 2024-05-03 | End: 2024-05-09

## 2024-05-03 RX ORDER — POLYETHYLENE GLYCOL 3350 17 G/17G
17 POWDER, FOR SOLUTION ORAL
Qty: 0 | Refills: 0 | DISCHARGE
Start: 2024-05-03

## 2024-05-03 RX ORDER — ACETAMINOPHEN 500 MG
2 TABLET ORAL
Qty: 0 | Refills: 0 | DISCHARGE
Start: 2024-05-03

## 2024-05-03 RX ORDER — CELECOXIB 200 MG/1
1 CAPSULE ORAL
Qty: 28 | Refills: 0
Start: 2024-05-03 | End: 2024-05-16

## 2024-05-03 RX ORDER — ASPIRIN/CALCIUM CARB/MAGNESIUM 324 MG
1 TABLET ORAL
Qty: 84 | Refills: 0
Start: 2024-05-03 | End: 2024-06-13

## 2024-05-03 RX ORDER — OXYCODONE HYDROCHLORIDE 5 MG/1
1 TABLET ORAL
Qty: 28 | Refills: 0
Start: 2024-05-03 | End: 2024-05-09

## 2024-05-03 RX ORDER — NALOXONE HYDROCHLORIDE 4 MG/.1ML
1 SPRAY NASAL
Qty: 1 | Refills: 0
Start: 2024-05-03 | End: 2024-05-03

## 2024-05-03 RX ADMIN — CELECOXIB 200 MILLIGRAM(S): 200 CAPSULE ORAL at 05:08

## 2024-05-03 RX ADMIN — FAMOTIDINE 20 MILLIGRAM(S): 10 INJECTION INTRAVENOUS at 12:48

## 2024-05-03 RX ADMIN — Medication 100 MILLIGRAM(S): at 00:02

## 2024-05-03 RX ADMIN — Medication 1000 MILLIGRAM(S): at 06:08

## 2024-05-03 RX ADMIN — Medication 81 MILLIGRAM(S): at 12:48

## 2024-05-03 RX ADMIN — Medication 1000 MILLIGRAM(S): at 13:48

## 2024-05-03 RX ADMIN — Medication 100 MILLIGRAM(S): at 10:13

## 2024-05-03 RX ADMIN — LOSARTAN POTASSIUM 50 MILLIGRAM(S): 100 TABLET, FILM COATED ORAL at 05:07

## 2024-05-03 RX ADMIN — Medication 1000 MILLIGRAM(S): at 05:08

## 2024-05-03 RX ADMIN — ATENOLOL 100 MILLIGRAM(S): 25 TABLET ORAL at 05:08

## 2024-05-03 RX ADMIN — PANTOPRAZOLE SODIUM 40 MILLIGRAM(S): 20 TABLET, DELAYED RELEASE ORAL at 05:08

## 2024-05-03 RX ADMIN — Medication 1 DROP(S): at 05:08

## 2024-05-03 RX ADMIN — Medication 1000 MILLIGRAM(S): at 12:48

## 2024-05-03 RX ADMIN — CELECOXIB 200 MILLIGRAM(S): 200 CAPSULE ORAL at 06:08

## 2024-05-03 NOTE — PROGRESS NOTE ADULT - PROBLEM SELECTOR PLAN 2
BP acceptable  - C/w home BP meds: Losartan, atenolol and nifedipine with hold parameters  - Monitor BP.

## 2024-05-03 NOTE — PROGRESS NOTE ADULT - SUBJECTIVE AND OBJECTIVE BOX
Patient is a 63y old  Female who presents with a chief complaint of R TKA (03 May 2024 12:13)      HPI:  63 yr old female with preop dx of unilateral primary osteoarthritis right knee presents to have PST ermias and is scheduled for right total knee arthroplasty with renu.    Patient presented to the office for evaluation of her bilateral knee pain. Patient has been experiencing bilateral (right greater than left) knee pain for years. Pain is located over the anterior and posterolateral knee. It can radiate to the tibias. Patient can have back pain that radiates down the legs. She has tried anti-inflammatories, but it increased her creatinine. She now takes Tylenol, without significant relief. Patient has tried physical therapy, which initially helped. She has tried corticosteroid injections, which helped for about 4 weeks. Her last injection was in December. Patient states she had bilateral knee pain for a while and has gotten worse over the past two years right>left. (2024 07:36)      REVIEW OF SYSTEMS  weakness    PAST MEDICAL & SURGICAL HISTORY  Hypertension    Hyperlipidemia    Glaucoma    Cataract    S/P cataract surgery    H/O  section       CURRENT FUNCTIONAL STATUS  5/2  Bed Mobility: Rolling/Turning:     · Level of [place]oyt	contact guard  · Physical Assist/Nonphysical Assist	1 person assist    Bed Mobility: Scooting/Bridging:     · Level of [place]	contact guard  · Physical Assist/Nonphysical Assist	1 person assist    Bed Mobility: Sit to Supine:     · Level of [place]t	contact guard  · Physical Assist/Nonphysical Assist	1 person assist    Bed Mobility: Supine to Sit:     · Level of [place]	contact guard  · Physical Assist/Nonphysical Assist	1 person assist    Transfer: Sit to Stand:     · Level of [place]t	contact guard  · Physical Assist/Nonphysical Assist	1 person assist    Transfer: Stand to Sit:     · Level of [place]t	contact guard  · Physical Assist/Nonphysical Assist	1 person assist    Gait Skills:     · Level of [place]	contact guard  · Physical Assist/Nonphysical Assist	1 person assist  · Assistive Device	rolling walker  · Gait Distance	100 feet    Gait Analysis:     · Gait Pattern Used	2-point gait  · Impairments Contributing to Gait Deviations	decreased strength    Stair Negotiation:     · Level of [place]	contact guard  · Physical Assist/Nonphysical Assist	1 person assist  · Weight-Bearing Restrictions	weight-bearing as tolerated; deferring additional steps  · Assistive Device	right rail up; left rail down  · Stair Pattern	step to step  · Number of Stairs	4      FAMILY HISTORY   FH: hypertension (Father, Mother)        RECENT LABS/IMAGING  CBC Full  -  ( 02 May 2024 05:24 )  WBC Count : 9.08 K/uL  RBC Count : 3.37 M/uL  Hemoglobin : 10.3 g/dL  Hematocrit : 31.8 %  Platelet Count - Automated : 244 K/uL  Mean Cell Volume : 94.4 fL  Mean Cell Hemoglobin : 30.6 pg  Mean Cell Hemoglobin Concentration : 32.4 gm/dL  Auto Neutrophil # : x  Auto Lymphocyte # : x  Auto Monocyte # : x  Auto Eosinophil # : x  Auto Basophil # : x  Auto Neutrophil % : x  Auto Lymphocyte % : x  Auto Monocyte % : x  Auto Eosinophil % : x  Auto Basophil % : x        136  |  99  |  21  ----------------------------<  91  5.0   |  25  |  1.19    Ca    9.2      02 May 2024 05:24      Urinalysis Basic - ( 02 May 2024 05:24 )    Color: x / Appearance: x / SG: x / pH: x  Gluc: 91 mg/dL / Ketone: x  / Bili: x / Urobili: x   Blood: x / Protein: x / Nitrite: x   Leuk Esterase: x / RBC: x / WBC x   Sq Epi: x / Non Sq Epi: x / Bacteria: x        VITALS  T(C): 36.7 (24 @ 09:25), Max: 37.1 (24 @ 18:26)  HR: 71 (24 @ 09:25) (71 - 76)  BP: 149/64 (24 @ 09:25) (108/53 - 149/64)  RR: 17 (24 @ 09:25) (16 - 18)  SpO2: 100% (24 @ 09:25) (100% - 100%)  Wt(kg): --    ALLERGIES  No Known Drug Allergies  pork (Vomiting)      MEDICATIONS   acetaminophen     Tablet .. 1000 milliGRAM(s) Oral every 8 hours  aspirin enteric coated 81 milliGRAM(s) Oral daily  atenolol  Tablet 100 milliGRAM(s) Oral daily  atorvastatin 10 milliGRAM(s) Oral at bedtime  ceFAZolin   IVPB 2000 milliGRAM(s) IV Intermittent every 8 hours  celecoxib 200 milliGRAM(s) Oral every 12 hours  chlorhexidine 2% Cloths 1 Application(s) Topical daily  famotidine    Tablet 20 milliGRAM(s) Oral daily  lactated ringers. 1000 milliLiter(s) IV Continuous <Continuous>  losartan 50 milliGRAM(s) Oral daily  magnesium hydroxide Suspension 30 milliLiter(s) Oral daily PRN  NIFEdipine XL 90 milliGRAM(s) Oral daily  ondansetron Injectable 4 milliGRAM(s) IV Push every 6 hours PRN  oxyCODONE    IR 10 milliGRAM(s) Oral every 3 hours PRN  oxyCODONE    IR 5 milliGRAM(s) Oral every 3 hours PRN  pantoprazole    Tablet 40 milliGRAM(s) Oral before breakfast  polyethylene glycol 3350 17 Gram(s) Oral at bedtime  senna 2 Tablet(s) Oral at bedtime  timolol 0.5% Solution 1 Drop(s) Both EYES two times a day  traMADol 50 milliGRAM(s) Oral every 6 hours PRN      ----------------------------------------------------------------------------------------   PHYSICAL EXAM  Constitutional - NAD, Comfortable  HEENT - NCAT, EOMI   Chest - no respiratory distress  Cardiovascular - RRR, S1S2   Abdomen -  Soft, NTND  Extremities -  R preveena in place  Neurologic Exam -                    Cognitive - Awake, Alert, AAO to self, place, date, year, situation     Communication - Fluent, No dysarthria     Cranial Nerves - CN 2-12 intact     Motor -                      LEFT    UE - ShAB 5/5, EF 5/5, EE 5/5, WE 5/5,  5/5                    RIGHT UE - ShAB 5/5, EF 5/5, EE 5/5, WE 5/5,  5/5                    LEFT    LE - HF 5/5, KE 5/5, DF 5/5, PF 5/5                    RIGHT LE - HF 4-/5, KE 4-/5, DF 4+/5, PF 4+/5        Sensory - Intact to LT   Psychiatric - Mood stable, Affect WNL  ----------------------------------------------------------------------------------------  ASSESSMENT/PLAN  63 year old female s/p R TKA on 24  continue bedside PT and OT  WBAT  Pain -celebrex, acetaminophen, tramadol prn  diet regular  DVT PPX - scd  preveena    Rehab -  recommend home when medically cleared with home PT and RW

## 2024-05-03 NOTE — PROGRESS NOTE ADULT - PROBLEM SELECTOR PLAN 1
S/p right total knee arthroplasty on 5/1.   - care per primary orthopedic team   - multimodal pain control + bowel regimen   - PT/OT  - Encourage OOB   - Encourage incentive spirometry   - Prevena Vac per ortho   - Post-op antibiotics per ortho   - DVT ppx: ASA 81mg daily   - Post operative labs reviewed and stable

## 2024-05-03 NOTE — PROGRESS NOTE ADULT - SUBJECTIVE AND OBJECTIVE BOX
S: Pt seen and examined. Doing well postoperatively. Pain well controlled. Denies N/V/CP/SOB. Has been OOB mobilizing w/out much issue.  Wants to go home.     Daughter at bedside.     O:   PE:  Gen: NAD, laying comfortably in bed  Resp: Unlabored [id number]  MSK:           +EHL/FHL/TA/Gas/SOl          +DP/SP/Sharyn/Saph           2+DP  Prevena + suctioning     ICU Vital Signs Last 24 Hrs  T(C): 36.5 (03 May 2024 06:14), Max: 37.1 (02 May 2024 18:26)  T(F): 97.7 (03 May 2024 06:14), Max: 98.8 (02 May 2024 18:26)  HR: 73 (03 May 2024 06:14) (73 - 83)  BP: 135/60 (03 May 2024 06:14) (108/53 - 146/65)  BP(mean): --  ABP: --  ABP(mean): --  RR: 17 (03 May 2024 06:14) (16 - 18)  SpO2: 100% (03 May 2024 06:14) (100% - 100%)    O2 Parameters below as of 03 May 2024 06:14  Patient On (Oxygen Delivery Method): room air        Assessment: s/p R TKA, progressing well.    Plan:    -Neuro: Multimodal pain control  -Resp: IS  -GI: reg diet  -MSK: OOB, WBAT, PT/OT  -Heme: DVT PPx  -Dispo: likely home today on duricef   -Prevena + in place      Ortho

## 2024-05-03 NOTE — PROGRESS NOTE ADULT - SUBJECTIVE AND OBJECTIVE BOX
CHIEF COMPLAINT: f/u     SUBJECTIVE / OVERNIGHT EVENTS: Patient seen and examined. Daughter at bedside. Reports she is doing well overall. Worked with PT this AM and it went well. Feels ready to go home. Denies chest pain or shortness of breath. Denies lightheadedness/dizziness.     MEDICATIONS  (STANDING):  acetaminophen     Tablet .. 1000 milliGRAM(s) Oral every 8 hours  aspirin enteric coated 81 milliGRAM(s) Oral daily  atenolol  Tablet 100 milliGRAM(s) Oral daily  atorvastatin 10 milliGRAM(s) Oral at bedtime  ceFAZolin   IVPB 2000 milliGRAM(s) IV Intermittent every 8 hours  celecoxib 200 milliGRAM(s) Oral every 12 hours  chlorhexidine 2% Cloths 1 Application(s) Topical daily  famotidine    Tablet 20 milliGRAM(s) Oral daily  lactated ringers. 1000 milliLiter(s) (30 mL/Hr) IV Continuous <Continuous>  losartan 50 milliGRAM(s) Oral daily  NIFEdipine XL 90 milliGRAM(s) Oral daily  pantoprazole    Tablet 40 milliGRAM(s) Oral before breakfast  polyethylene glycol 3350 17 Gram(s) Oral at bedtime  senna 2 Tablet(s) Oral at bedtime  timolol 0.5% Solution 1 Drop(s) Both EYES two times a day    MEDICATIONS  (PRN):  magnesium hydroxide Suspension 30 milliLiter(s) Oral daily PRN Constipation  ondansetron Injectable 4 milliGRAM(s) IV Push every 6 hours PRN Nausea and/or Vomiting  oxyCODONE    IR 10 milliGRAM(s) Oral every 3 hours PRN Severe Pain (7 - 10)  oxyCODONE    IR 5 milliGRAM(s) Oral every 3 hours PRN Moderate Pain (4 - 6)  traMADol 50 milliGRAM(s) Oral every 6 hours PRN Mild Pain (1 - 3)      VITALS:  T(F): 98 (05-03-24 @ 09:25), Max: 98.8 (05-02-24 @ 18:26)  HR: 71 (05-03-24 @ 09:25) (71 - 83)  BP: 149/64 (05-03-24 @ 09:25) (108/53 - 149/64)  RR: 17 (05-03-24 @ 09:25) (16 - 18)  SpO2: 100% (05-03-24 @ 09:25)  Wt(kg): --      PHYSICAL EXAM:  GENERAL: obese female in NAD, well-groomed, well-developed  EYES: Conjunctiva and sclera clear  ENMT: Moist mucous membranes  RESPIRATORY: Clear to auscultation bilaterally; No rales, rhonchi, wheezing, or rubs  CARDIOVASCULAR: Regular rate and rhythm; No murmurs, rubs, or gallops  GASTROINTESTINAL: Soft, Nontender, Nondistended; Bowel sounds present  EXTREMITIES: R knee w/ Prevena Vac, compartments soft, sensation intact   NERVOUS SYSTEM:  Alert & Oriented X3; Moving all 4 extremities; No gross sensory deficits    LABS:              10.3                 136  | 25   | 21           9.08  >-----------< 244     ------------------------< 91                    31.8                 5.0  | 99   | 1.19                                         Ca 9.2   Mg x     Ph x                  Urinalysis Basic - ( 02 May 2024 05:24 )    Color: x / Appearance: x / SG: x / pH: x  Gluc: 91 mg/dL / Ketone: x  / Bili: x / Urobili: x   Blood: x / Protein: x / Nitrite: x   Leuk Esterase: x / RBC: x / WBC x   Sq Epi: x / Non Sq Epi: x / Bacteria: x        RADIOLOGY & ADDITIONAL TESTS:  Imaging Personally Reviewed: [x] Yes    [ ] Consultant(s) Notes Reviewed:  [x] Care Discussed with Consultants/Other Providers: Orthopedic PA - discussed

## 2024-05-07 ENCOUNTER — APPOINTMENT (OUTPATIENT)
Dept: ORTHOPEDIC SURGERY | Facility: CLINIC | Age: 64
End: 2024-05-07
Payer: COMMERCIAL

## 2024-05-07 PROCEDURE — 99024 POSTOP FOLLOW-UP VISIT: CPT

## 2024-05-09 NOTE — HISTORY OF PRESENT ILLNESS
[de-identified] : 5/7/2024  Oliva Kunz presents to the office for follow-up of her right total knee arthroplasty.  Patient underwent right TKA on 5/1/2024.  She is currently doing well overall.  Patient is currently in home physical therapy.  She is taking her aspirin for her DVT prophylaxis.  Patient presents today for removal of Prevena wound VAC and placement of Aquacel dressing.  4/26/2024  Oliva Kunz presents to the office for follow-up of her bilateral knee pain.  Patient continues to have bilateral (right greater than left) knee pain.  She went to presurgical testing today.  No falls.  No fevers or chills.  4/12/2024 Oliva Kunz is a 63 year old female who presented to the office for evaluation of her bilateral knee pain. Patient has been experiencing bilateral (right greater than left) knee pain for years. Pain is located over the anterior and posterolateral knee. It can radiate to the tibias. Patient can have back pain that radiates down the legs. She has tried anti-inflammatories, but it increased her creatinine. She now takes Tylenol, without significant relief. Patient has tried physical therapy, which initially helped. She has tried corticosteroid injections, which helped for about 4 weeks. He last injection was in December. Viscosupplementation injections were previously denied by insurance. No hip pain.  History: HTN, Renal Cysts

## 2024-05-09 NOTE — PHYSICAL EXAM
[de-identified] : Constitutional:  63 year old female, alert and oriented, cooperative, in no acute distress.  HEENT  NC/AT.  Appearance: symmetric  Neck/Back Straight without deformity or instability.  Good ROM.  Chest/Respiratory  Respiratory effort: no intercostal retractions or use of accessory muscles. Nonlabored Breathing  Skin  On inspection, warm and dry without rashes or lesions.  Mental Status:  Judgment, insight: intact Orientation: oriented to time, place, and person  Neurological: Sensory and Motor are grossly intact throughout  Right Knee  Inspection:     Incision well healing. No erythema or drainage. Dermabond and Aquacel Dressing placed     No Effusion     Non-tender to palpation over tibial tubercle, patella, medial and lateral joint line, and pes insertion.  Range of Motion: 	Extension - 0 degrees 	Flexion - 105 degrees 	Extensor lag: None  Stability:      Demonstrates no Varus or Valgus instability      Negative Anterior or Posterior drawer.      No mid flexion instability  Patella: stable, tracks well.   Neurologic Exam     Motor intact including 5/5 Extensor Hallucis Longus, 5/5 Flexor Hallucis Longus, 5/5 Tibialis Anterior and 5/5 Gastrocnemius     Sensation Intact to Light Touch including Saphenous, Sural, Superficial Peroneal, Deep Peroneal, Tibial nerve distributions  Vascular Exam     Foot is warm and well perfused with 2+ Dorsalis Pedis Pulse   No pain with range of motion of the bilateral hips or left knee. No lumbar paraspinal muscle tenderness.

## 2024-05-09 NOTE — DISCUSSION/SUMMARY
[de-identified] : Oliva Kunz is a 63-year-old female who presents to the office for follow-up of her right total knee arthroplasty.  Patient underwent right TKA on 5/1/2024.  Examination showed that incision was well-healing.  Prevena wound VAC was removed and Dermabond and Aquacel dressing were placed.  Discussed with patient the examination findings.  Discussed with patient the recovery from total knee arthroplasty, including physical therapy and DVT prophylaxis.  Patient will continue her home physical therapy.  She will continue her aspirin 81 mg twice per day for a total of 6 weeks.  Patient will follow-up in 1 week for reevaluation and management.  Patient understanding and in agreement with the plan.  All questions answered.  Plan: -Physical therapy -DVT prophylaxis: Aspirin 81 mg twice per day for a total of 6 weeks -Follow-up in 1 week for reevaluation and management

## 2024-05-16 ENCOUNTER — APPOINTMENT (OUTPATIENT)
Dept: ORTHOPEDIC SURGERY | Facility: CLINIC | Age: 64
End: 2024-05-16
Payer: COMMERCIAL

## 2024-05-16 VITALS
DIASTOLIC BLOOD PRESSURE: 83 MMHG | WEIGHT: 198 LBS | OXYGEN SATURATION: 98 % | HEART RATE: 90 BPM | BODY MASS INDEX: 39.92 KG/M2 | HEIGHT: 59 IN | SYSTOLIC BLOOD PRESSURE: 143 MMHG

## 2024-05-16 PROCEDURE — 99024 POSTOP FOLLOW-UP VISIT: CPT

## 2024-05-16 PROCEDURE — 73562 X-RAY EXAM OF KNEE 3: CPT | Mod: RT

## 2024-05-18 ENCOUNTER — NON-APPOINTMENT (OUTPATIENT)
Age: 64
End: 2024-05-18

## 2024-05-28 NOTE — DISCUSSION/SUMMARY
[de-identified] : Oliva Kunz is a 63-year-old female who presents to the office for follow-up of her right total knee arthroplasty.  Patient underwent right TKA on 5/1/2024.  Examination showed that incision was well-healing.  XRays showed right total knee arthroplasty in good position and alignment. Examination showed range of motion 0 to 110. Discussed with the patient the examination and imaging findings. Discussed the management of total knee arthroplasty at this time, including physical therapy and DVT prophylaxis. Patient was given a referral to physical therapy. Patient will continued to take Aspirin twice daily for a total of 6 weeks for DVT prophylaxis. Dressing was removed. Discussed that patient may shower, but should not soak the wound. Patient will follow up in 4 weeks for reevaluation and management. Patient understanding and in agreement with the plan. All questions answered.   Plan: -Physical Therapy -DVT Prophylaxis: Aspirin twice daily for a total of 6 weeks -Patient may shower, but should not soak the wound -Follow up in 4 weeks for reevaluation and management

## 2024-05-28 NOTE — HISTORY OF PRESENT ILLNESS
[de-identified] : 5/16/2024  Oliva Kunz presents to the office for follow-up of her right total knee arthroplasty.  Patient underwent right TKA on 5/1/2024.  She is currently doing well overall.  Patient is taking her aspirin for her DVT prophylaxis.  No falls.  No fevers or chills.  5/7/2024  Oliva Kunz presents to the office for follow-up of her right total knee arthroplasty.  Patient underwent right TKA on 5/1/2024.  She is currently doing well overall.  Patient is currently in home physical therapy.  She is taking her aspirin for her DVT prophylaxis.  Patient presents today for removal of Prevena wound VAC and placement of Aquacel dressing.  4/26/2024  Oliva Kunz presents to the office for follow-up of her bilateral knee pain.  Patient continues to have bilateral (right greater than left) knee pain.  She went to presurgical testing today.  No falls.  No fevers or chills.  4/12/2024 Oliva Kunz is a 63 year old female who presented to the office for evaluation of her bilateral knee pain. Patient has been experiencing bilateral (right greater than left) knee pain for years. Pain is located over the anterior and posterolateral knee. It can radiate to the tibias. Patient can have back pain that radiates down the legs. She has tried anti-inflammatories, but it increased her creatinine. She now takes Tylenol, without significant relief. Patient has tried physical therapy, which initially helped. She has tried corticosteroid injections, which helped for about 4 weeks. He last injection was in December. Viscosupplementation injections were previously denied by insurance. No hip pain.  History: HTN, Renal Cysts

## 2024-05-28 NOTE — PHYSICAL EXAM
[de-identified] : Constitutional:  63 year old female, alert and oriented, cooperative, in no acute distress.  HEENT  NC/AT.  Appearance: symmetric  Neck/Back Straight without deformity or instability.  Good ROM.  Chest/Respiratory  Respiratory effort: no intercostal retractions or use of accessory muscles. Nonlabored Breathing  Skin  On inspection, warm and dry without rashes or lesions.  Mental Status:  Judgment, insight: intact Orientation: oriented to time, place, and person  Neurological: Sensory and Motor are grossly intact throughout  Right Knee  Inspection:     Incision well healing. No erythema or drainage.     No Effusion     Non-tender to palpation over tibial tubercle, patella, medial and lateral joint line, and pes insertion.  Range of Motion: 	Extension - 0 degrees 	Flexion - 110 degrees 	Extensor lag: None  Stability:      Demonstrates no Varus or Valgus instability      Negative Anterior or Posterior drawer.      No mid flexion instability  Patella: stable, tracks well.   Neurologic Exam     Motor intact including 5/5 Extensor Hallucis Longus, 5/5 Flexor Hallucis Longus, 5/5 Tibialis Anterior and 5/5 Gastrocnemius     Sensation Intact to Light Touch including Saphenous, Sural, Superficial Peroneal, Deep Peroneal, Tibial nerve distributions  Vascular Exam     Foot is warm and well perfused with 2+ Dorsalis Pedis Pulse   No pain with range of motion of the bilateral hips or left knee. No lumbar paraspinal muscle tenderness. [de-identified] : ADDENDUM:  XRay:  XRays of the Right Knee (3 Views) taken in the office today and reviewed with the patient. XRays demonstrate a Right Total Knee Arthroplasty in good position and alignment. There is no obvious evidence of fracture, dislocation, osteolysis or loosening. (my personal interpretation) Components: [employer] CS Cemented

## 2024-06-05 ENCOUNTER — NON-APPOINTMENT (OUTPATIENT)
Age: 64
End: 2024-06-05

## 2024-06-20 ENCOUNTER — APPOINTMENT (OUTPATIENT)
Dept: ORTHOPEDIC SURGERY | Facility: CLINIC | Age: 64
End: 2024-06-20

## 2024-06-20 VITALS
SYSTOLIC BLOOD PRESSURE: 130 MMHG | OXYGEN SATURATION: 98 % | RESPIRATION RATE: 16 BRPM | WEIGHT: 198 LBS | HEIGHT: 59 IN | HEART RATE: 83 BPM | BODY MASS INDEX: 39.92 KG/M2 | DIASTOLIC BLOOD PRESSURE: 80 MMHG

## 2024-06-20 DIAGNOSIS — M17.12 UNILATERAL PRIMARY OSTEOARTHRITIS, LEFT KNEE: ICD-10-CM

## 2024-06-20 DIAGNOSIS — Z96.659 PRESENCE OF UNSPECIFIED ARTIFICIAL KNEE JOINT: ICD-10-CM

## 2024-06-20 PROCEDURE — 73562 X-RAY EXAM OF KNEE 3: CPT | Mod: RT

## 2024-06-20 PROCEDURE — 20610 DRAIN/INJ JOINT/BURSA W/O US: CPT | Mod: LT,58

## 2024-06-20 PROCEDURE — 99024 POSTOP FOLLOW-UP VISIT: CPT

## 2024-06-22 PROBLEM — M17.12 ARTHRITIS OF KNEE, LEFT: Status: ACTIVE | Noted: 2022-03-16

## 2024-06-22 PROBLEM — Z96.659 S/P TOTAL KNEE ARTHROPLASTY: Status: ACTIVE | Noted: 2024-05-09

## 2024-06-22 NOTE — HISTORY OF PRESENT ILLNESS
[de-identified] : 6/20/2024  Oliva Kunz presented to the office for follow up of her right total knee arthroplasty. Patient underwent right TKA on 5/1/2024. She is currently doing well overall. She is currently in PT. Patient has been walking with a cane. She has completed her ASA. No falls. no fevers/chills. She now has left knee pain. Her renal cyst is currently being worked up.  5/16/2024  Oliva Kunz presents to the office for follow-up of her right total knee arthroplasty.  Patient underwent right TKA on 5/1/2024.  She is currently doing well overall.  Patient is taking her aspirin for her DVT prophylaxis.  No falls.  No fevers or chills.  5/7/2024  Oliva Kunz presents to the office for follow-up of her right total knee arthroplasty.  Patient underwent right TKA on 5/1/2024.  She is currently doing well overall.  Patient is currently in home physical therapy.  She is taking her aspirin for her DVT prophylaxis.  Patient presents today for removal of Prevena wound VAC and placement of Aquacel dressing.  4/26/2024  Oliva Kunz presents to the office for follow-up of her bilateral knee pain.  Patient continues to have bilateral (right greater than left) knee pain.  She went to presurgical testing today.  No falls.  No fevers or chills.  4/12/2024 Oliva Kunz is a 63 year old female who presented to the office for evaluation of her bilateral knee pain. Patient has been experiencing bilateral (right greater than left) knee pain for years. Pain is located over the anterior and posterolateral knee. It can radiate to the tibias. Patient can have back pain that radiates down the legs. She has tried anti-inflammatories, but it increased her creatinine. She now takes Tylenol, without significant relief. Patient has tried physical therapy, which initially helped. She has tried corticosteroid injections, which helped for about 4 weeks. He last injection was in December. Viscosupplementation injections were previously denied by insurance. No hip pain.  History: HTN, Renal Cysts

## 2024-06-22 NOTE — DISCUSSION/SUMMARY
[de-identified] : Oliva Kunz is a 63-year-old female who presents to the office for follow-up of her right total knee arthroplasty.  Patient underwent right TKA on 5/1/2024.  Examination showed that incision was well-healing.  XRays showed Right total knee arthroplasty in good position and alignment. Examination showed range of motion 0 to 105. Discussed with the patient the examination and imaging findings. Discussed the management of total knee arthroplasty at this time, including physical therapy. Patient will continue physical therapy. Patient has completed DVT prophylaxis. Patient would like a knee corticosteroid injection today.  Discussed the risks of corticosteroid injections.  Patient was given a Left knee corticosteroid injection using aseptic technique.  Patient tolerated the procedure well.  Patient will follow up in 6 weeks for reevaluation and management. Patient understanding and in agreement with the plan. All questions answered.   Plan: -Left Knee Corticosteroid Injection Given -Physical Therapy -DVT Prophylaxis: Completed -Follow up in 6 weeks for reevaluation and management   Procedure Note: Left knee corticosteroid injection  A Left Knee corticosteroid injection was given today. Risk and benefits of the injection were discussed, including but not limited to infection, fat atrophy, skin discoloration, failure to achieve desired results and elevated blood sugars.  The area was prepped in the usual sterile fashion. The injection was given with 1 cc (40 mg) Methylprednisolone and 4 cc 1% Lidocaine and the patient tolerated it well.   Risk of cortisone injection are minimal but present. There is the rare risk of joint infection, skin and soft tissue thinning or whitening of the skin surrounding the injection site, thinning of nearby bone, or the risk of elevated blood glucose in diabetics. Diabetics receiving steroid injection should follow their blood sugars very closely for several days after receiving the injections.  In the event of uncontrolled elevated blood glucose, they should seek medical attention.  There's some concern that repeated use of cortisone shots may cause deterioration of the cartilage within a joint. For this reason, doctors typically limit the number of cortisone shots in a joint. The limit varies depending on the joint and the reason for treatment.  Cortisone shots usually include a corticosteroid medication and a local anesthetic. The local anesthetic helps to numb the joint at the time of the injection and last for several hours. The cortisone acts to relieve pain and inflammation but may take several days to begin to work. Some people do experience a cortisone flare after the injection and may have increased pain for 2 to 3 days after the injection, and then pain can begin to improve.  Patient was instructed to call or return for any signs or symptoms of infection after an injection including increased pain, swelling, redness or fevers.

## 2024-06-22 NOTE — PHYSICAL EXAM
[de-identified] : Constitutional:  63 year old female, alert and oriented, cooperative, in no acute distress.  HEENT  NC/AT.  Appearance: symmetric  Neck/Back Straight without deformity or instability.  Good ROM.  Chest/Respiratory  Respiratory effort: no intercostal retractions or use of accessory muscles. Nonlabored Breathing  Skin  On inspection, warm and dry without rashes or lesions.  Mental Status:  Judgment, insight: intact Orientation: oriented to time, place, and person  Neurological: Sensory and Motor are grossly intact throughout  Left Knee  Inspection:     Skin intact. No erythema or drainage.     No Effusion     Non-tender to palpation over tibial tubercle, patella, medial and lateral joint line, and pes insertion.  Range of Motion: 	Extension - 0 degrees 	Flexion - 95 degrees 	Extensor lag: None  Stability:      Demonstrates no Varus or Valgus instability      Negative Anterior or Posterior drawer.  Patella: stable, tracks well.    Right Knee  Inspection:     Incision well healing. No erythema or drainage.     No Effusion     Non-tender to palpation over tibial tubercle, patella, medial and lateral joint line, and pes insertion.  Range of Motion: 	Extension - 0 degrees 	Flexion - 105 degrees 	Extensor lag: None  Stability:      Demonstrates no Varus or Valgus instability      Negative Anterior or Posterior drawer.      No mid flexion instability  Patella: stable, tracks well.   Neurologic Exam     Motor intact including 5/5 Extensor Hallucis Longus, 5/5 Flexor Hallucis Longus, 5/5 Tibialis Anterior and 5/5 Gastrocnemius     Sensation Intact to Light Touch including Saphenous, Sural, Superficial Peroneal, Deep Peroneal, Tibial nerve distributions  Vascular Exam     Foot is warm and well perfused with 2+ Dorsalis Pedis Pulse   No pain with range of motion of the bilateral hips or left knee. No lumbar paraspinal muscle tenderness. [de-identified] : XRay:  XRays of the Right Knee (3 Views) taken in the office today and reviewed with the patient. XRays demonstrate a Right Total Knee Arthroplasty in good position and alignment. There is no obvious evidence of fracture, dislocation, osteolysis or loosening. (my personal interpretation) Components: [employer] CS Cemented  XRay: XRays of the Left Knee (4 Views) taken on 4/12/2024. XRays demonstrate tricompartmental joint space narrowing, with bone on bone articulations in the medial compartment, with subchondral sclerosis, overlying osteophytes, all consistent with severe osteoarthritis, KL thGthrthathdtheth:th th5th. There is varus alignment. (my personal interpretation)

## 2024-08-01 ENCOUNTER — APPOINTMENT (OUTPATIENT)
Dept: ORTHOPEDIC SURGERY | Facility: CLINIC | Age: 64
End: 2024-08-01

## 2024-08-01 DIAGNOSIS — Z96.659 PRESENCE OF UNSPECIFIED ARTIFICIAL KNEE JOINT: ICD-10-CM

## 2024-08-01 PROCEDURE — 73562 X-RAY EXAM OF KNEE 3: CPT | Mod: RT

## 2024-08-01 PROCEDURE — 99213 OFFICE O/P EST LOW 20 MIN: CPT

## 2024-08-01 NOTE — HISTORY OF PRESENT ILLNESS
[de-identified] : 8/1/2024  Oliva Kunz presents to the office for follow-up of her right total knee arthroplasty.  Patient underwent right TKA on 5/1/2024.  She is currently doing well overall.  She is currently in physical therapy.  Patient has been walking with a cane.  Patient is planned for a renal surgery to remove a renal mass.  Surgery is planned for 8/6/2024.  6/20/2024  Oliva Kunz presented to the office for follow up of her right total knee arthroplasty. Patient underwent right TKA on 5/1/2024. She is currently doing well overall. She is currently in PT. Patient has been walking with a cane. She has completed her ASA. No falls. no fevers/chills. She now has left knee pain. Her renal cyst is currently being worked up.  5/16/2024  Oliva Kunz presents to the office for follow-up of her right total knee arthroplasty.  Patient underwent right TKA on 5/1/2024.  She is currently doing well overall.  Patient is taking her aspirin for her DVT prophylaxis.  No falls.  No fevers or chills.  5/7/2024  Oliva Kunz presents to the office for follow-up of her right total knee arthroplasty.  Patient underwent right TKA on 5/1/2024.  She is currently doing well overall.  Patient is currently in home physical therapy.  She is taking her aspirin for her DVT prophylaxis.  Patient presents today for removal of Prevena wound VAC and placement of Aquacel dressing.  4/26/2024  Oliva Kunz presents to the office for follow-up of her bilateral knee pain.  Patient continues to have bilateral (right greater than left) knee pain.  She went to presurgical testing today.  No falls.  No fevers or chills.  4/12/2024 Oliva Kunz is a 63 year old female who presented to the office for evaluation of her bilateral knee pain. Patient has been experiencing bilateral (right greater than left) knee pain for years. Pain is located over the anterior and posterolateral knee. It can radiate to the tibias. Patient can have back pain that radiates down the legs. She has tried anti-inflammatories, but it increased her creatinine. She now takes Tylenol, without significant relief. Patient has tried physical therapy, which initially helped. She has tried corticosteroid injections, which helped for about 4 weeks. He last injection was in December. Viscosupplementation injections were previously denied by insurance. No hip pain.  History: HTN, Renal Cysts

## 2024-08-01 NOTE — DISCUSSION/SUMMARY
[de-identified] : Oliva Kunz is a 63-year-old female who presents to the office for follow-up of her right total knee arthroplasty.  Patient underwent right TKA on 5/1/2024.  Examination showed that incision was well-healing.  XRays showed Right total knee arthroplasty in good position and alignment. Examination showed range of motion 0 to 105. Discussed with the patient the examination and imaging findings. Discussed the management of total knee arthroplasty at this time, including physical therapy and home exercises. Patient will continue physical therapy and home exercises. Patient will participate in activities as tolerated. Patient will follow up in 3 months for reevaluation and management. Patient understanding and in agreement with the plan. All questions answered.     Plan: -Physical Therapy -Home Exercises -Activities as Tolerated -Follow up in 3 months for reevaluation and management

## 2024-08-01 NOTE — DISCUSSION/SUMMARY
[de-identified] : Oliva Kunz is a 63-year-old female who presents to the office for follow-up of her right total knee arthroplasty.  Patient underwent right TKA on 5/1/2024.  Examination showed that incision was well-healing.  XRays showed Right total knee arthroplasty in good position and alignment. Examination showed range of motion 0 to 105. Discussed with the patient the examination and imaging findings. Discussed the management of total knee arthroplasty at this time, including physical therapy and home exercises. Patient will continue physical therapy and home exercises. Patient will participate in activities as tolerated. Patient will follow up in 3 months for reevaluation and management. Patient understanding and in agreement with the plan. All questions answered.     Plan: -Physical Therapy -Home Exercises -Activities as Tolerated -Follow up in 3 months for reevaluation and management

## 2024-08-01 NOTE — PHYSICAL EXAM
[de-identified] : Constitutional:  63 year old female, alert and oriented, cooperative, in no acute distress.  HEENT  NC/AT.  Appearance: symmetric  Neck/Back Straight without deformity or instability.  Good ROM.  Chest/Respiratory  Respiratory effort: no intercostal retractions or use of accessory muscles. Nonlabored Breathing  Skin  On inspection, warm and dry without rashes or lesions.  Mental Status:  Judgment, insight: intact Orientation: oriented to time, place, and person  Neurological: Sensory and Motor are grossly intact throughout  Right Knee  Inspection:     Incision well healing. No erythema or drainage.     No Effusion     Non-tender to palpation over tibial tubercle, patella, medial and lateral joint line, and pes insertion.  Range of Motion: 	Extension - 0 degrees 	Flexion - 105 degrees 	Extensor lag: None  Stability:      Demonstrates no Varus or Valgus instability      Negative Anterior or Posterior drawer.      No mid flexion instability  Patella: stable, tracks well.   Neurologic Exam     Motor intact including 5/5 Extensor Hallucis Longus, 5/5 Flexor Hallucis Longus, 5/5 Tibialis Anterior and 5/5 Gastrocnemius     Sensation Intact to Light Touch including Saphenous, Sural, Superficial Peroneal, Deep Peroneal, Tibial nerve distributions  Vascular Exam     Foot is warm and well perfused with 2+ Dorsalis Pedis Pulse   No pain with range of motion of the bilateral hips or left knee. No lumbar paraspinal muscle tenderness. [de-identified] : XRay:  XRays of the Right Knee (3 Views) taken in the office today and reviewed with the patient. XRays demonstrate a Right Total Knee Arthroplasty in good position and alignment. There is no obvious evidence of fracture, dislocation, osteolysis or loosening. (my personal interpretation) Components: [employer] CS Cemented  XRay: XRays of the Left Knee (4 Views) taken on 4/12/2024. XRays demonstrate tricompartmental joint space narrowing, with bone on bone articulations in the medial compartment, with subchondral sclerosis, overlying osteophytes, all consistent with severe osteoarthritis, KL rdGrdrrdarddrderd:rd rd3rd. There is varus alignment. (my personal interpretation)

## 2024-08-01 NOTE — PHYSICAL EXAM
[de-identified] : Constitutional:  63 year old female, alert and oriented, cooperative, in no acute distress.  HEENT  NC/AT.  Appearance: symmetric  Neck/Back Straight without deformity or instability.  Good ROM.  Chest/Respiratory  Respiratory effort: no intercostal retractions or use of accessory muscles. Nonlabored Breathing  Skin  On inspection, warm and dry without rashes or lesions.  Mental Status:  Judgment, insight: intact Orientation: oriented to time, place, and person  Neurological: Sensory and Motor are grossly intact throughout  Right Knee  Inspection:     Incision well healing. No erythema or drainage.     No Effusion     Non-tender to palpation over tibial tubercle, patella, medial and lateral joint line, and pes insertion.  Range of Motion: 	Extension - 0 degrees 	Flexion - 105 degrees 	Extensor lag: None  Stability:      Demonstrates no Varus or Valgus instability      Negative Anterior or Posterior drawer.      No mid flexion instability  Patella: stable, tracks well.   Neurologic Exam     Motor intact including 5/5 Extensor Hallucis Longus, 5/5 Flexor Hallucis Longus, 5/5 Tibialis Anterior and 5/5 Gastrocnemius     Sensation Intact to Light Touch including Saphenous, Sural, Superficial Peroneal, Deep Peroneal, Tibial nerve distributions  Vascular Exam     Foot is warm and well perfused with 2+ Dorsalis Pedis Pulse   No pain with range of motion of the bilateral hips or left knee. No lumbar paraspinal muscle tenderness. [de-identified] : XRay:  XRays of the Right Knee (3 Views) taken in the office today and reviewed with the patient. XRays demonstrate a Right Total Knee Arthroplasty in good position and alignment. There is no obvious evidence of fracture, dislocation, osteolysis or loosening. (my personal interpretation) Components: [employer] CS Cemented  XRay: XRays of the Left Knee (4 Views) taken on 4/12/2024. XRays demonstrate tricompartmental joint space narrowing, with bone on bone articulations in the medial compartment, with subchondral sclerosis, overlying osteophytes, all consistent with severe osteoarthritis, KL thGthrthathdtheth:th th5th. There is varus alignment. (my personal interpretation)

## 2024-08-01 NOTE — HISTORY OF PRESENT ILLNESS
[de-identified] : 8/1/2024  Oliva Kunz presents to the office for follow-up of her right total knee arthroplasty.  Patient underwent right TKA on 5/1/2024.  She is currently doing well overall.  She is currently in physical therapy.  Patient has been walking with a cane.  Patient is planned for a renal surgery to remove a renal mass.  Surgery is planned for 8/6/2024.  6/20/2024  Oliva Kunz presented to the office for follow up of her right total knee arthroplasty. Patient underwent right TKA on 5/1/2024. She is currently doing well overall. She is currently in PT. Patient has been walking with a cane. She has completed her ASA. No falls. no fevers/chills. She now has left knee pain. Her renal cyst is currently being worked up.  5/16/2024  Oliva Kunz presents to the office for follow-up of her right total knee arthroplasty.  Patient underwent right TKA on 5/1/2024.  She is currently doing well overall.  Patient is taking her aspirin for her DVT prophylaxis.  No falls.  No fevers or chills.  5/7/2024  Oliva Kunz presents to the office for follow-up of her right total knee arthroplasty.  Patient underwent right TKA on 5/1/2024.  She is currently doing well overall.  Patient is currently in home physical therapy.  She is taking her aspirin for her DVT prophylaxis.  Patient presents today for removal of Prevena wound VAC and placement of Aquacel dressing.  4/26/2024  Oliva Kunz presents to the office for follow-up of her bilateral knee pain.  Patient continues to have bilateral (right greater than left) knee pain.  She went to presurgical testing today.  No falls.  No fevers or chills.  4/12/2024 Oliva Kunz is a 63 year old female who presented to the office for evaluation of her bilateral knee pain. Patient has been experiencing bilateral (right greater than left) knee pain for years. Pain is located over the anterior and posterolateral knee. It can radiate to the tibias. Patient can have back pain that radiates down the legs. She has tried anti-inflammatories, but it increased her creatinine. She now takes Tylenol, without significant relief. Patient has tried physical therapy, which initially helped. She has tried corticosteroid injections, which helped for about 4 weeks. He last injection was in December. Viscosupplementation injections were previously denied by insurance. No hip pain.  History: HTN, Renal Cysts

## 2024-09-08 NOTE — OCCUPATIONAL THERAPY INITIAL EVALUATION ADULT - TOILETING, PREVIOUS LEVEL OF FUNCTION, OT EVAL
independent pt alert and oriented. observed guarding abdomen. c/o abd pain. states no other issues. mother at bedside.

## 2024-11-05 ENCOUNTER — APPOINTMENT (OUTPATIENT)
Dept: ORTHOPEDIC SURGERY | Facility: CLINIC | Age: 64
End: 2024-11-05

## 2025-04-26 ENCOUNTER — NON-APPOINTMENT (OUTPATIENT)
Age: 65
End: 2025-04-26

## 2025-06-25 ENCOUNTER — NON-APPOINTMENT (OUTPATIENT)
Age: 65
End: 2025-06-25

## 2025-06-26 ENCOUNTER — APPOINTMENT (OUTPATIENT)
Dept: ORTHOPEDIC SURGERY | Facility: CLINIC | Age: 65
End: 2025-06-26
Payer: MEDICARE

## 2025-06-26 PROCEDURE — 99203 OFFICE O/P NEW LOW 30 MIN: CPT

## (undated) DEVICE — DRAPE 3/4 SHEET 52X76"

## (undated) DEVICE — PACK TOTAL JOINT

## (undated) DEVICE — SOL IRR BAG NS 0.9% 3000ML

## (undated) DEVICE — MAKO VIZADISC KNEE TRACKING KIT

## (undated) DEVICE — SAW BLADE STRYKER SAGITTAL LONG 18MM

## (undated) DEVICE — WOUND IRR SURGIPHOR

## (undated) DEVICE — TOURNIQUET ESMARK 6"

## (undated) DEVICE — STRYKER MIXEVAC 3 BONE CEMENT MIXER

## (undated) DEVICE — DRSG PREVENA PLUS SYSTEM

## (undated) DEVICE — DRSG COBAN 6"

## (undated) DEVICE — HANDPIECE INTERPULSE W/ COAXIAL FAN SPRAY TIP

## (undated) DEVICE — DRSG DERMABOND 0.7ML

## (undated) DEVICE — DRSG STOCKINETTE IMPERVIOUS XL

## (undated) DEVICE — DRSG ACE BANDAGE 6"

## (undated) DEVICE — DRSG AQUACEL 3.5 X 10"

## (undated) DEVICE — LABELS BLANK W PEN

## (undated) DEVICE — ELCTR BOVIE PENCIL SMOKE EVACUATION

## (undated) DEVICE — SUT VICRYL PLUS 0 27" OS-6 UNDYED

## (undated) DEVICE — DRAPE EXTREMITY 87" X 106" X 128"

## (undated) DEVICE — SOL IRR POUR NS 0.9% 1500ML

## (undated) DEVICE — VENODYNE/SCD SLEEVE CALF MEDIUM

## (undated) DEVICE — SUCTION YANKAUER NO CONTROL VENT

## (undated) DEVICE — TAPE SILK 3"

## (undated) DEVICE — WARMING BLANKET FULL ADULT

## (undated) DEVICE — FRAZIER SUCTION TIP 8FR

## (undated) DEVICE — NDL HYPO SAFE 21G X 1.5" (GREEN)

## (undated) DEVICE — DRSG CURITY GAUZE SPONGE 4 X 4" 12-PLY

## (undated) DEVICE — SUT QUILL PDO 0 45CM 36MM

## (undated) DEVICE — MAKO CHECKPOINT KIT FEMORAL / TIBIAL

## (undated) DEVICE — SYR LUER LOK 20CC

## (undated) DEVICE — DRAPE SPLIT SHEET 77" X 120"

## (undated) DEVICE — ELCTR GROUNDING PAD ADULT COVIDIEN

## (undated) DEVICE — MAKO DRAPE KIT

## (undated) DEVICE — SAW BLADE STRYKER SAGITTAL 3 HOLE OSCILLATING

## (undated) DEVICE — SUT QUILL MONODERM 3-0 30CM 24MM

## (undated) DEVICE — SUT VICRYL 1 36" CTX UNDYED

## (undated) DEVICE — SUT QUILL MONODERM 0 36CM 36MM

## (undated) DEVICE — PREP CHLORAPREP HI-LITE ORANGE 26ML

## (undated) DEVICE — PACK LIJ BASIC ORTHO

## (undated) DEVICE — DRAPE U POLY BLUE 60"X60"

## (undated) DEVICE — MAKO BLADE NARROW

## (undated) DEVICE — DRSG AQUACEL 3.5 X 12"

## (undated) DEVICE — PROTECTOR HEEL / ELBOW

## (undated) DEVICE — SAW BLADE STRYKER RECIPROCATING DOUBLE EDGE 68X12.5MM

## (undated) DEVICE — POSITIONER STRAP ARMBOARD VELCRO TS-30

## (undated) DEVICE — TOURNIQUET CUFF 34" DUAL PORT W PLC

## (undated) DEVICE — GOWN XXL

## (undated) DEVICE — SUT QUILL PDO 2 36CM 40MM

## (undated) DEVICE — GLV 8 PROTEXIS (CREAM) MICRO

## (undated) DEVICE — HOOD T5 PEELAWAY

## (undated) DEVICE — SOL IRR POUR H2O 1500ML